# Patient Record
Sex: MALE | Race: WHITE | Employment: PART TIME | URBAN - METROPOLITAN AREA
[De-identification: names, ages, dates, MRNs, and addresses within clinical notes are randomized per-mention and may not be internally consistent; named-entity substitution may affect disease eponyms.]

---

## 2017-01-19 ENCOUNTER — HOSPITAL ENCOUNTER (OUTPATIENT)
Dept: LAB | Age: 72
Discharge: HOME | End: 2017-01-19
Payer: MEDICARE

## 2017-01-19 ENCOUNTER — OFFICE VISIT (OUTPATIENT)
Dept: INTERNAL MEDICINE CLINIC | Age: 72
End: 2017-01-19

## 2017-01-19 VITALS
SYSTOLIC BLOOD PRESSURE: 130 MMHG | HEART RATE: 65 BPM | DIASTOLIC BLOOD PRESSURE: 80 MMHG | WEIGHT: 191.8 LBS | HEIGHT: 69 IN | RESPIRATION RATE: 16 BRPM | TEMPERATURE: 97.9 F | BODY MASS INDEX: 28.41 KG/M2 | OXYGEN SATURATION: 97 %

## 2017-01-19 DIAGNOSIS — E11.8 TYPE 2 DIABETES MELLITUS WITH COMPLICATION, WITHOUT LONG-TERM CURRENT USE OF INSULIN (HCC): Primary | Chronic | ICD-10-CM

## 2017-01-19 DIAGNOSIS — M75.101 ROTATOR CUFF TEAR ARTHROPATHY OF RIGHT SHOULDER: ICD-10-CM

## 2017-01-19 DIAGNOSIS — M12.811 ROTATOR CUFF TEAR ARTHROPATHY OF RIGHT SHOULDER: ICD-10-CM

## 2017-01-19 DIAGNOSIS — D64.9 ANEMIA, UNSPECIFIED TYPE: ICD-10-CM

## 2017-01-19 DIAGNOSIS — I10 ESSENTIAL HYPERTENSION: Chronic | ICD-10-CM

## 2017-01-19 PROCEDURE — 80048 BASIC METABOLIC PNL TOTAL CA: CPT

## 2017-01-19 PROCEDURE — 83036 HEMOGLOBIN GLYCOSYLATED A1C: CPT

## 2017-01-19 PROCEDURE — 85025 COMPLETE CBC W/AUTO DIFF WBC: CPT

## 2017-01-19 PROCEDURE — 80061 LIPID PANEL: CPT

## 2017-01-19 PROCEDURE — 36415 COLL VENOUS BLD VENIPUNCTURE: CPT

## 2017-01-19 RX ORDER — LOSARTAN POTASSIUM 100 MG/1
100 TABLET ORAL DAILY
Qty: 90 TAB | Refills: 1 | Status: SHIPPED | OUTPATIENT
Start: 2017-01-19 | End: 2017-08-08 | Stop reason: SDUPTHER

## 2017-01-19 RX ORDER — TRAMADOL HYDROCHLORIDE 50 MG/1
50 TABLET ORAL
Qty: 180 TAB | Refills: 0 | Status: SHIPPED | OUTPATIENT
Start: 2017-01-19 | End: 2017-05-01 | Stop reason: SDUPTHER

## 2017-01-19 RX ORDER — METFORMIN HYDROCHLORIDE 1000 MG/1
1000 TABLET ORAL 2 TIMES DAILY WITH MEALS
Qty: 180 TAB | Refills: 1 | Status: SHIPPED | OUTPATIENT
Start: 2017-01-19 | End: 2017-07-21 | Stop reason: SDUPTHER

## 2017-01-19 NOTE — PROGRESS NOTES
Veknata Lopez is a 66 y.o. male who presents today for Labs (A1C check )  . He has a history of   Patient Active Problem List   Diagnosis Code    Rotator cuff tear M75.100    Biceps rupture, proximal S46.119A    Rotator cuff tear arthropathy of right shoulder M12.811    Primary localized osteoarthrosis, lower leg M17.10    Hypertension I10    DM type 2 (diabetes mellitus, type 2) ([facility]) E11.9    Renal insufficiency N28.9    GERD (gastroesophageal reflux disease) K21.9   . Today patient is here for follow up.   he does not have other concerns. Pt notes that still having therapy once weekly following his R shoulder surgery. His wife is having some significant neck pain and will be seeing  [employer] soon. Diabetes Mellitus follow-up - on both januvia and metromin. BG has been good. Interested in stopping Januvia 2/2 cost.   Last hemoglobin a1c   Lab Results   Component Value Date/Time    Hemoglobin A1c 6.3 10/18/2016 11:05 AM     No components found for: POCA1C, HBA1C POC  medication compliance: compliant all of the time. Diabetic diet compliance: compliant most of the time. Home glucose monitoring: fasting values range 100's. Hypoglycemic episodes:  none. Further diabetic ROS: no polyuria or polydipsia, no chest pain, dyspnea or TIA's, no numbness, tingling or pain in extremities. Diabetes Complications: none  Last Eye Exam: due  Last Foot Exam: today. Microalbuminuria:   Lab Results   Component Value Date/Time    Microalb/Creat ratio (ug/mg creat.) <2.8 10/18/2016 11:05 AM    Microalbumin, urine <3.0 10/18/2016 11:05 AM     Hypertension - Not checking at home. Has been under some stress. Hypertension ROS: taking medications as instructed, no medication side effects noted, no TIA's, no chest pain on exertion, no dyspnea on exertion, no swelling of ankles     reports that he has never smoked. He has never used smokeless tobacco.    reports that he drinks alcohol.    BP Readings from Last 2 Encounters:   01/19/17 130/80   10/18/16 128/74     ROS  Review of Systems   Constitutional: Negative for chills, fever and weight loss. HENT: Negative for congestion and sore throat. Eyes: Negative for blurred vision, double vision and photophobia. Respiratory: Negative for cough and shortness of breath. Cardiovascular: Negative for chest pain, palpitations and leg swelling. Gastrointestinal: Negative for abdominal pain, constipation, diarrhea, heartburn, nausea and vomiting. Genitourinary: Negative for dysuria, frequency and urgency. Musculoskeletal: Negative for joint pain and myalgias. Skin: Negative for rash. Neurological: Negative. Negative for headaches. Endo/Heme/Allergies: Does not bruise/bleed easily. Psychiatric/Behavioral: Negative for memory loss and suicidal ideas. Visit Vitals    /80 (BP 1 Location: Left arm, BP Patient Position: Sitting)    Pulse 65    Temp 97.9 °F (36.6 °C) (Oral)    Resp 16    Ht 5' 9\" (1.753 m)    Wt 191 lb 12.8 oz (87 kg)    SpO2 97%    BMI 28.32 kg/m2       Physical Exam   Constitutional: He is oriented to person, place, and time and well-developed, well-nourished, and in no distress. No distress. HENT:   Head: Normocephalic and atraumatic. Mouth/Throat: No oropharyngeal exudate. Eyes: Conjunctivae are normal. Pupils are equal, round, and reactive to light. No scleral icterus. Neck: Normal range of motion. No JVD present. No thyromegaly present. Cardiovascular: Normal rate and regular rhythm. Exam reveals no gallop and no friction rub. No murmur heard. Pulmonary/Chest: Effort normal and breath sounds normal. No respiratory distress. He has no wheezes. Abdominal: Soft. Bowel sounds are normal. He exhibits no distension. There is no rebound and no guarding. Musculoskeletal: Normal range of motion. He exhibits no edema. Neurological: He is alert and oriented to person, place, and time.  No cranial nerve deficit. Skin: Skin is dry. No rash noted. Psychiatric: Mood, memory, affect and judgment normal.   Foot exam  - skin intact, mild dryness w no fissures, no rashes, no significant ulcers or callous formation. Sensation intact by microfilament or light touch      Current Outpatient Prescriptions   Medication Sig    metFORMIN (GLUCOPHAGE) 1,000 mg tablet Take 1 Tab by mouth two (2) times daily (with meals).  losartan (COZAAR) 100 mg tablet Take 1 Tab by mouth daily.  traMADol (ULTRAM) 50 mg tablet Take 1 Tab by mouth two (2) times daily as needed for Pain. Max Daily Amount: 100 mg.    CALCIUM CARBONATE (CALCIUM 500 PO) Take 1 Tab by mouth two (2) times a day.  sitaGLIPtin (JANUVIA) 100 mg tablet Take 100 mg by mouth daily.  ergocalciferol (VITAMIN D2) 50,000 unit capsule Take 50,000 Units by mouth every thirty (30) days. Takes 1st of month    OMEPRAZOLE PO Take 20 mg by mouth daily.  FERROUS FUMARATE (IRON PO) Take 325 mg by mouth daily (after dinner).  FOLIC ACID/MULTIVIT-MIN/LUTEIN (CENTRUM SILVER PO) Take 1 Tab by mouth daily.  triamterene-hydrochlorothiazide (DYAZIDE) 37.5-25 mg per capsule Take 1 Cap by mouth daily. No current facility-administered medications for this visit.          Past Medical History   Diagnosis Date    Arthritis     Diabetes ([address]     GERD (gastroesophageal reflux disease)     Hypertension     Nausea & vomiting       Past Surgical History   Procedure Laterality Date    Hx cataract removal Bilateral 9/2012    Hx orthopaedic Left 1/2012     total knee replacement    Hx knee arthroscopy Left     Hx mohs procedure Right 3/2015     and bicep tear repair      Social History   Substance Use Topics    Smoking status: Never Smoker    Smokeless tobacco: Never Used    Alcohol use Yes      Comment: less than 1 beer/month      Family History   Problem Relation Age of Onset    Heart Disease Father     Stroke Father     Hypertension Father     Cancer Brother lung    Cancer Brother      lung        No Known Allergies     Assessment/Plan  Kalyani Davis was seen today for labs. Diagnoses and all orders for this visit:    Type 2 diabetes mellitus with complication, without long-term current use of insulin (HCC) - Good control. He is to make sure that he is taking 2000mg of metformin. Interested in stopping [place] [facility] [place] due to cost. If A1C stable of lower, will hold. Discussed sulfonoureas and potential harm given that they can drop his BG.   -     metFORMIN (GLUCOPHAGE) 1,000 mg tablet; Take 1 Tab by mouth two (2) times daily (with meals). -     HEMOGLOBIN A1C WITH EAG    Essential hypertension - At goal on repeat. Continue diuretic and ARB. -     LIPID PANEL  -     METABOLIC PANEL, BASIC  -     losartan (COZAAR) 100 mg tablet; Take 1 Tab by mouth daily.  -     CBC WITH AUTOMATED DIFF    Rotator cuff tear arthropathy of right shoulder - still taking tramadol. Discussed not taking this scheduled but rather as needed. -     traMADol (ULTRAM) 50 mg tablet; Take 1 Tab by mouth two (2) times daily as needed for Pain. Max Daily Amount: 100 mg.     Anemia, unspecified type - repeat, if normal hold Fe.   -     CBC WITH AUTOMATED DIFF        Follow-up Disposition:  Return in about 3 months (around 4/19/2017) for Medicare Wellness Exam.    Beatriz Panda MD  1/19/2017

## 2017-01-19 NOTE — MR AVS SNAPSHOT
Visit Information Date & Time Provider Department Dept. Phone Encounter #  
 1/19/2017  8:15 AM Soledad Post MD [facility] [address] [id number] Follow-up Instructions Return in about 3 months (around 4/19/2017) for Medicare Wellness Exam. Upcoming Health Maintenance Date Due  
 LIPID PANEL Q1 1945 EYE EXAM RETINAL OR DILATED Q1 12/18/1954 DTaP/Tdap/Td series (1 - Tdap) 12/18/1965 FOBT Q 1 YEAR AGE 50-75 12/18/1994 ZOSTER VACCINE AGE 60> 12/18/2004 GLAUCOMA SCREENING Q2Y 12/18/2009 Pneumococcal 65+ Low/Medium Risk (1 of 2 - PCV13) 12/18/2009 MEDICARE YEARLY EXAM 12/18/2009 HEMOGLOBIN A1C Q6M 4/18/2017 MICROALBUMIN Q1 10/18/2017 FOOT EXAM Q1 1/19/2018 Allergies as of 1/19/2017  Review Complete On: 1/19/2017 By: Louann Hummel No Known Allergies Current Immunizations  Never Reviewed Name Date Influenza High Dose Vaccine PF 10/18/2016 10:48 AM  
  
 Not reviewed this visit You Were Diagnosed With   
  
 Codes Comments Type 2 diabetes mellitus with complication, without long-term current use of insulin (HCC)    -  Primary ICD-10-CM: E11.8 ICD-9-CM: 250.90 Essential hypertension     ICD-10-CM: I10 
ICD-9-CM: 401.9 Rotator cuff tear arthropathy of right shoulder     ICD-10-CM: M12.811 ICD-9-CM: 716.81 Anemia, unspecified type     ICD-10-CM: D64.9 ICD-9-CM: 391. 9 Vitals BP Pulse Temp Resp Height(growth percentile) Weight(growth percentile) 130/80 (BP 1 Location: Left arm, BP Patient Position: Sitting) 65 97.9 °F (36.6 °C) (Oral) 16 5' 9\" (1.753 m) 191 lb 12.8 oz (87 kg) SpO2 BMI Smoking Status 97% 28.32 kg/m2 Never Smoker Vitals History BMI and BSA Data Body Mass Index Body Surface Area  
 28.32 kg/m 2 2.06 m 2 Preferred Pharmacy Pharmacy Name Phone  [address] AT [facility] OF  [facility] [phone] Your Updated Medication List  
  
   
This list is accurate as of: 1/19/17  9:16 AM.  Always use your most recent med list.  
  
  
  
  
 CALCIUM 500 PO Take 1 Tab by mouth two (2) times a day. CENTRUM SILVER PO Take 1 Tab by mouth daily. IRON PO Take 325 mg by mouth daily (after dinner). losartan 100 mg tablet Commonly known as:  COZAAR Take 1 Tab by mouth daily. metFORMIN 1,000 mg tablet Commonly known as:  GLUCOPHAGE Take 1 Tab by mouth two (2) times daily (with meals). OMEPRAZOLE PO Take 20 mg by mouth daily. SITagliptin 100 mg tablet Commonly known as:  Tran Readlyn Take 100 mg by mouth daily. traMADol 50 mg tablet Commonly known as:  ULTRAM  
Take 1 Tab by mouth two (2) times daily as needed for Pain. Max Daily Amount: 100 mg.  
  
 triamterene-hydroCHLOROthiazide 37.5-25 mg per capsule Commonly known as:  Roselyn Olszewski Take 1 Cap by mouth daily. VITAMIN D2 50,000 unit capsule Generic drug:  ergocalciferol Take 50,000 Units by mouth every thirty (30) days. Takes 1st of month Prescriptions Printed Refills  
 traMADol (ULTRAM) 50 mg tablet 0 Sig: Take 1 Tab by mouth two (2) times daily as needed for Pain. Max Daily Amount: 100 mg. Class: Print Route: Oral  
  
Prescriptions Sent to Pharmacy Refills  
 metFORMIN (GLUCOPHAGE) 1,000 mg tablet 1 Sig: Take 1 Tab by mouth two (2) times daily (with meals). Class: Normal  
 Pharmacy: [facility] [address] Ph #: [phone] Route: Oral  
 losartan (COZAAR) 100 mg tablet 1 Sig: Take 1 Tab by mouth daily. Class: Normal  
 Pharmacy: [facility] [address] Ph #: [phone] Route: Oral  
  
We Performed the Following  CBC WITH AUTOMATED DIFF [74804 CPT(R)] HEMOGLOBIN A1C WITH EAG [76204 CPT(R)] LIPID PANEL [12588 CPT(R)] METABOLIC PANEL, BASIC [20987 CPT(R)] Follow-up Instructions Return in about 3 months (around 4/19/2017) for Medicare Wellness Exam.  
  
  
Introducing [facility]! [employer] introduces [employer] patient portal. Now you can access parts of your medical record, email your doctor's office, and request medication refills online. 1. In your internet browser, go to [email or web address] 2. Click on the First Time User? Click Here link in the Sign In box. You will see the New Member Sign Up page. 3. Enter your [employer] Access Code exactly as it appears below. You will not need to use this code after youve completed the sign-up process. If you do not sign up before the expiration date, you must request a new code. · [employer] Access Code: [email or web address] Expires: 4/19/2017  9:16 AM 
 
4. Enter the last four digits of your Social Security Number (xxxx) and Date of Birth (mm/dd/yyyy) as indicated and click Submit. You will be taken to the next sign-up page. 5. Create a [employer] ID. This will be your [employer] login ID and cannot be changed, so think of one that is secure and easy to remember. 6. Create a [employer] password. You can change your password at any time. 7. Enter your Password Reset Question and Answer. This can be used at a later time if you forget your password. 8. Enter your e-mail address. You will receive e-mail notification when new information is available in [address] 9. Click Sign Up. You can now view and download portions of your medical record. 10. Click the Download Summary menu link to download a portable copy of your medical information. If you have questions, please visit the Frequently Asked Questions section of the [employer] website. Remember, [employer] is NOT to be used for urgent needs. For medical emergencies, dial 911. Now available from your iPhone and Android! Please provide this summary of care documentation to your next provider. Your primary care clinician is listed as Frances Simmonds. If you have any questions after today's visit, please call [phone].

## 2017-01-20 LAB
BASOPHILS # BLD AUTO: 0 X10E3/UL (ref 0–0.2)
BASOPHILS NFR BLD AUTO: 0 %
BUN SERPL-MCNC: 23 MG/DL (ref 8–27)
BUN/CREAT SERPL: 18 (ref 10–22)
CALCIUM SERPL-MCNC: 9.5 MG/DL (ref 8.6–10.2)
CHLORIDE SERPL-SCNC: 99 MMOL/L (ref 96–106)
CHOLEST SERPL-MCNC: 131 MG/DL (ref 100–199)
CO2 SERPL-SCNC: 25 MMOL/L (ref 18–29)
CREAT SERPL-MCNC: 1.29 MG/DL (ref 0.76–1.27)
EOSINOPHIL # BLD AUTO: 0.1 X10E3/UL (ref 0–0.4)
EOSINOPHIL NFR BLD AUTO: 1 %
ERYTHROCYTE [DISTWIDTH] IN BLOOD BY AUTOMATED COUNT: 13.5 % (ref 12.3–15.4)
EST. AVERAGE GLUCOSE BLD GHB EST-MCNC: 126 MG/DL
GLUCOSE SERPL-MCNC: 122 MG/DL (ref 65–99)
HBA1C MFR BLD: 6 % (ref 4.8–5.6)
HCT VFR BLD AUTO: 41.3 % (ref 37.5–51)
HDLC SERPL-MCNC: 34 MG/DL
HGB BLD-MCNC: 14 G/DL (ref 12.6–17.7)
IMM GRANULOCYTES # BLD: 0 X10E3/UL (ref 0–0.1)
IMM GRANULOCYTES NFR BLD: 0 %
INTERPRETATION, 910389: NORMAL
INTERPRETATION: NORMAL
LDLC SERPL CALC-MCNC: 74 MG/DL (ref 0–99)
LYMPHOCYTES # BLD AUTO: 1.9 X10E3/UL (ref 0.7–3.1)
LYMPHOCYTES NFR BLD AUTO: 29 %
MCH RBC QN AUTO: 30.8 PG (ref 26.6–33)
MCHC RBC AUTO-ENTMCNC: 33.9 G/DL (ref 31.5–35.7)
MCV RBC AUTO: 91 FL (ref 79–97)
MONOCYTES # BLD AUTO: 0.6 X10E3/UL (ref 0.1–0.9)
MONOCYTES NFR BLD AUTO: 9 %
NEUTROPHILS # BLD AUTO: 4 X10E3/UL (ref 1.4–7)
NEUTROPHILS NFR BLD AUTO: 61 %
PLATELET # BLD AUTO: 192 X10E3/UL (ref 150–379)
POTASSIUM SERPL-SCNC: 4.6 MMOL/L (ref 3.5–5.2)
RBC # BLD AUTO: 4.55 X10E6/UL (ref 4.14–5.8)
SODIUM SERPL-SCNC: 141 MMOL/L (ref 134–144)
TRIGL SERPL-MCNC: 115 MG/DL (ref 0–149)
VLDLC SERPL CALC-MCNC: 23 MG/DL (ref 5–40)
WBC # BLD AUTO: 6.6 X10E3/UL (ref 3.4–10.8)

## 2017-05-01 ENCOUNTER — HOSPITAL ENCOUNTER (OUTPATIENT)
Dept: LAB | Age: 72
Discharge: HOME | End: 2017-05-01
Payer: MEDICARE

## 2017-05-01 ENCOUNTER — OFFICE VISIT (OUTPATIENT)
Dept: INTERNAL MEDICINE CLINIC | Age: 72
End: 2017-05-01

## 2017-05-01 VITALS
RESPIRATION RATE: 16 BRPM | DIASTOLIC BLOOD PRESSURE: 90 MMHG | SYSTOLIC BLOOD PRESSURE: 140 MMHG | WEIGHT: 198 LBS | BODY MASS INDEX: 29.33 KG/M2 | HEART RATE: 62 BPM | OXYGEN SATURATION: 96 % | TEMPERATURE: 98.1 F | HEIGHT: 69 IN

## 2017-05-01 DIAGNOSIS — N28.9 RENAL INSUFFICIENCY: Chronic | ICD-10-CM

## 2017-05-01 DIAGNOSIS — G89.29 CHRONIC PAIN OF LEFT KNEE: ICD-10-CM

## 2017-05-01 DIAGNOSIS — M25.562 CHRONIC PAIN OF LEFT KNEE: ICD-10-CM

## 2017-05-01 DIAGNOSIS — E11.8 TYPE 2 DIABETES MELLITUS WITH COMPLICATION, WITHOUT LONG-TERM CURRENT USE OF INSULIN (HCC): Primary | Chronic | ICD-10-CM

## 2017-05-01 DIAGNOSIS — I10 ESSENTIAL HYPERTENSION: Chronic | ICD-10-CM

## 2017-05-01 PROCEDURE — 83036 HEMOGLOBIN GLYCOSYLATED A1C: CPT

## 2017-05-01 PROCEDURE — 80048 BASIC METABOLIC PNL TOTAL CA: CPT

## 2017-05-01 RX ORDER — TRAMADOL HYDROCHLORIDE 50 MG/1
50 TABLET ORAL
Qty: 180 TAB | Refills: 0 | Status: SHIPPED | OUTPATIENT
Start: 2017-05-01 | End: 2017-08-08 | Stop reason: SDUPTHER

## 2017-05-01 RX ORDER — TRIAMTERENE/HYDROCHLOROTHIAZID 37.5-25 MG
TABLET ORAL
Refills: 3 | COMMUNITY
Start: 2017-03-04 | End: 2017-08-08 | Stop reason: SDUPTHER

## 2017-05-01 NOTE — MR AVS SNAPSHOT
Visit Information Date & Time Provider Department Dept. Phone Encounter #  
 5/1/2017  3:45 PM Nicolasa Ramirez MD [facility] [address] [id number] Follow-up Instructions Return in about 3 months (around 8/1/2017) for Medicare wellness. Upcoming Health Maintenance Date Due  
 EYE EXAM RETINAL OR DILATED Q1 12/18/1954 DTaP/Tdap/Td series (1 - Tdap) 12/18/1965 FOBT Q 1 YEAR AGE 50-75 12/18/1994 ZOSTER VACCINE AGE 60> 12/18/2004 GLAUCOMA SCREENING Q2Y 12/18/2009 Pneumococcal 65+ Low/Medium Risk (1 of 2 - PCV13) 12/18/2009 MEDICARE YEARLY EXAM 12/18/2009 HEMOGLOBIN A1C Q6M 7/19/2017 INFLUENZA AGE 9 TO ADULT 8/1/2017 MICROALBUMIN Q1 10/18/2017 FOOT EXAM Q1 1/19/2018 LIPID PANEL Q1 1/19/2018 Allergies as of 5/1/2017  Review Complete On: 5/1/2017 By: Nicolasa Ramirez MD  
 No Known Allergies Current Immunizations  Never Reviewed Name Date Influenza High Dose Vaccine PF 10/18/2016 10:48 AM  
  
 Not reviewed this visit You Were Diagnosed With   
  
 Codes Comments Type 2 diabetes mellitus with complication, without long-term current use of insulin (HCC)    -  Primary ICD-10-CM: E11.8 ICD-9-CM: 250.90 Essential hypertension     ICD-10-CM: I10 
ICD-9-CM: 401.9 Renal insufficiency     ICD-10-CM: N28.9 ICD-9-CM: 593.9 Chronic pain of left knee     ICD-10-CM: M25.562, G89.29 ICD-9-CM: 719.46, 338.29 Vitals BP Pulse Temp Resp Height(growth percentile) Weight(growth percentile) 140/90 (BP 1 Location: Left arm, BP Patient Position: Sitting) 62 98.1 °F (36.7 °C) (Oral) 16 5' 9\" (1.753 m) 198 lb (89.8 kg) SpO2 BMI Smoking Status 96% 29.24 kg/m2 Never Smoker Vitals History BMI and BSA Data Body Mass Index Body Surface Area  
 29.24 kg/m 2 2.09 m 2 Preferred Pharmacy Pharmacy Name Phone  [address] [facility] [address] [facility] [phone] Your Updated Medication List  
  
   
This list is accurate as of: 5/1/17  4:31 PM.  Always use your most recent med list.  
  
  
  
  
 CALCIUM 500 PO Take 1 Tab by mouth two (2) times a day. CENTRUM SILVER PO Take 1 Tab by mouth daily. IRON PO Take 325 mg by mouth daily (after dinner). JANUVIA 100 mg tablet Generic drug:  SITagliptin Take 100 mg by mouth daily. losartan 100 mg tablet Commonly known as:  COZAAR Take 1 Tab by mouth daily. metFORMIN 1,000 mg tablet Commonly known as:  GLUCOPHAGE Take 1 Tab by mouth two (2) times daily (with meals). OMEPRAZOLE PO Take 20 mg by mouth daily. traMADol 50 mg tablet Commonly known as:  ULTRAM  
Take 1 Tab by mouth two (2) times daily as needed for Pain. Max Daily Amount: 100 mg.  
  
 * triamterene-hydroCHLOROthiazide 37.5-25 mg per capsule Commonly known as:  Jose Velasco Take 1 Cap by mouth daily. * triamterene-hydroCHLOROthiazide 37.5-25 mg per tablet Commonly known as:  Tabatha Cheek TK 1 T PO QD  
  
 VITAMIN D2 50,000 unit capsule Generic drug:  ergocalciferol Take 50,000 Units by mouth every thirty (30) days. Takes 1st of month * Notice: This list has 2 medication(s) that are the same as other medications prescribed for you. Read the directions carefully, and ask your doctor or other care provider to review them with you. Prescriptions Printed Refills  
 traMADol (ULTRAM) 50 mg tablet 0 Sig: Take 1 Tab by mouth two (2) times daily as needed for Pain. Max Daily Amount: 100 mg. Class: Print Route: Oral  
  
We Performed the Following HEMOGLOBIN A1C WITH EAG [76085 CPT(R)] METABOLIC PANEL, BASIC [34380 CPT(R)] Follow-up Instructions Return in about 3 months (around 8/1/2017) for Medicare wellness. Introducing [facility]!    
 [employer] introduces [employer] patient portal. Now you can access parts of your medical record, email your doctor's office, and request medication refills online. 1. In your internet browser, go to [email or web address] 2. Click on the First Time User? Click Here link in the Sign In box. You will see the New Member Sign Up page. 3. Enter your [employer] Access Code exactly as it appears below. You will not need to use this code after youve completed the sign-up process. If you do not sign up before the expiration date, you must request a new code. · [employer] Access Code: [email or web address] Expires: 7/30/2017  4:30 PM 
 
4. Enter the last four digits of your Social Security Number (xxxx) and Date of Birth (mm/dd/yyyy) as indicated and click Submit. You will be taken to the next sign-up page. 5. Create a [employer] ID. This will be your [employer] login ID and cannot be changed, so think of one that is secure and easy to remember. 6. Create a [employer] password. You can change your password at any time. 7. Enter your Password Reset Question and Answer. This can be used at a later time if you forget your password. 8. Enter your e-mail address. You will receive e-mail notification when new information is available in [address] 9. Click Sign Up. You can now view and download portions of your medical record. 10. Click the Download Summary menu link to download a portable copy of your medical information. If you have questions, please visit the Frequently Asked Questions section of the [employer] website. Remember, [employer] is NOT to be used for urgent needs. For medical emergencies, dial 911. Now available from your iPhone and Android! Please provide this summary of care documentation to your next provider. Your primary care clinician is listed as Capri Lorenzo. If you have any questions after today's visit, please call [phone].

## 2017-05-01 NOTE — PROGRESS NOTES
Ne Dawson is a 66 y.o. male who presents today for Hypertension and Diabetes  . He has a history of   Patient Active Problem List   Diagnosis Code    Rotator cuff tear M75.100    Biceps rupture, proximal S46.119A    Rotator cuff tear arthropathy of right shoulder M12.811    Primary localized osteoarthrosis, lower leg M17.10    Hypertension I10    DM type 2 (diabetes mellitus, type 2) ([facility]) E11.9    Renal insufficiency N28.9    GERD (gastroesophageal reflux disease) K21.9   . Today patient is here for f/u.   he does have other concerns. L knee replaced in 2012. Done in [place]. Now having pain with movement. Achy ness. No actual weakness. No edema or erythema. Hypertension - On maxide/ ARB. Borderline control. Hypertension ROS: taking medications as instructed, no medication side effects noted, no TIA's, no chest pain on exertion, no dyspnea on exertion, no swelling of ankles     reports that he has never smoked. He has never used smokeless tobacco.    reports that he drinks alcohol. BP Readings from Last 2 Encounters:   05/01/17 140/90   01/19/17 130/80     Diabetes Mellitus follow-up - Januvia stopped due to price. BG went up to 150s and he resumed. Paying 102$ for 3 mos. Last hemoglobin a1c   Lab Results   Component Value Date/Time    Hemoglobin A1c 6.0 01/19/2017 09:36 AM     No components found for: POCA1C, HBA1C POC  medication compliance: compliant all of the time. Diabetic diet compliance: compliant most of the time. Home glucose monitoring: fasting values range 120. Hypoglycemic episodes:  none. Further diabetic ROS: no polyuria or polydipsia, no chest pain, dyspnea or TIA's, no numbness, tingling or pain in extremities.    Diabetes Complications: none  Microalbuminuria:   Lab Results   Component Value Date/Time    Microalb/Creat ratio (ug/mg creat.) <2.8 10/18/2016 11:05 AM    Microalbumin, urine <3.0 10/18/2016 11:05 AM         ROS  Review of Systems Constitutional: Negative for chills, fever and weight loss. HENT: Negative for congestion and sore throat. Eyes: Negative for blurred vision, double vision and photophobia. Respiratory: Negative for cough and shortness of breath. Cardiovascular: Negative for chest pain, palpitations and leg swelling. Gastrointestinal: Negative for abdominal pain, constipation, diarrhea, heartburn, nausea and vomiting. Genitourinary: Negative for dysuria, frequency and urgency. Musculoskeletal: Positive for joint pain. Negative for myalgias. Skin: Negative for rash. Neurological: Negative. Negative for headaches. Endo/Heme/Allergies: Does not bruise/bleed easily. Psychiatric/Behavioral: Negative for memory loss and suicidal ideas. Visit Vitals    /90 (BP 1 Location: Left arm, BP Patient Position: Sitting)    Pulse 62    Temp 98.1 °F (36.7 °C) (Oral)    Resp 16    Ht 5' 9\" (1.753 m)    Wt 198 lb (89.8 kg)    SpO2 96%    BMI 29.24 kg/m2       Physical Exam   Constitutional: He appears well-nourished. HENT:   Head: Normocephalic and atraumatic. Eyes: EOM are normal. Pupils are equal, round, and reactive to light. Neck: Normal range of motion. Neck supple. Cardiovascular: Normal rate and regular rhythm. No murmur heard. Pulmonary/Chest: Effort normal and breath sounds normal. No respiratory distress. Abdominal: Soft. Bowel sounds are normal. He exhibits no distension. Musculoskeletal: Normal range of motion. He exhibits no edema. L knee replacement present   Neurological: He is alert. Skin: Skin is warm and dry. Psychiatric: He has a normal mood and affect. His behavior is normal.         Current Outpatient Prescriptions   Medication Sig    SITagliptin (JANUVIA) 100 mg tablet Take 100 mg by mouth daily.  traMADol (ULTRAM) 50 mg tablet Take 1 Tab by mouth two (2) times daily as needed for Pain.  Max Daily Amount: 100 mg.    metFORMIN (GLUCOPHAGE) 1,000 mg tablet Take 1 Tab by mouth two (2) times daily (with meals).  losartan (COZAAR) 100 mg tablet Take 1 Tab by mouth daily.  CALCIUM CARBONATE (CALCIUM 500 PO) Take 1 Tab by mouth two (2) times a day.  ergocalciferol (VITAMIN D2) 50,000 unit capsule Take 50,000 Units by mouth every thirty (30) days. Takes 1st of month    OMEPRAZOLE PO Take 20 mg by mouth daily.  FERROUS FUMARATE (IRON PO) Take 325 mg by mouth daily (after dinner).  FOLIC ACID/MULTIVIT-MIN/LUTEIN (CENTRUM SILVER PO) Take 1 Tab by mouth daily.  triamterene-hydrochlorothiazide (DYAZIDE) 37.5-25 mg per capsule Take 1 Cap by mouth daily.  triamterene-hydroCHLOROthiazide (MAXZIDE) 37.5-25 mg per tablet TK 1 T PO QD     No current facility-administered medications for this visit. Past Medical History:   Diagnosis Date    Arthritis     Diabetes ([address]     GERD (gastroesophageal reflux disease)     Hypertension     Nausea & vomiting       Past Surgical History:   Procedure Laterality Date    HX CATARACT REMOVAL Bilateral 9/2012    HX KNEE ARTHROSCOPY Left     HX MOHS PROCEDURES Right 3/2015    and bicep tear repair    HX ORTHOPAEDIC Left 1/2012    total knee replacement      Social History   Substance Use Topics    Smoking status: Never Smoker    Smokeless tobacco: Never Used    Alcohol use Yes      Comment: less than 1 beer/month      Family History   Problem Relation Age of Onset    Heart Disease Father     Stroke Father     Hypertension Father     Cancer Brother      lung    Cancer Brother      lung        No Known Allergies     Assessment/Plan  Lum Butt was seen today for hypertension and diabetes. Diagnoses and all orders for this visit:    Type 2 diabetes mellitus with complication, without long-term current use of insulin ([address] - Unable to stop DPP4. Will call insurance to see if other in class is cheaper.  Blood work today.   -     HEMOGLOBIN A1C WITH EAG  -     METABOLIC PANEL, BASIC    Essential hypertension - borderline control. Monitor at home. Renal insufficiency - has been stable. Avoid NSAIDs. Chronic pain of left knee - will talk with Dr. Monica Arechiga about his knee. -     traMADol (ULTRAM) 50 mg tablet; Take 1 Tab by mouth two (2) times daily as needed for Pain. Max Daily Amount: 100 mg. Follow-up Disposition:  Return in about 3 months (around 8/1/2017) for Medicare wellness.     Christophe Wiley MD  5/1/2017

## 2017-05-02 LAB
BUN SERPL-MCNC: 21 MG/DL (ref 8–27)
BUN/CREAT SERPL: 17 (ref 10–24)
CALCIUM SERPL-MCNC: 9.1 MG/DL (ref 8.6–10.2)
CHLORIDE SERPL-SCNC: 103 MMOL/L (ref 96–106)
CO2 SERPL-SCNC: 20 MMOL/L (ref 18–29)
CREAT SERPL-MCNC: 1.26 MG/DL (ref 0.76–1.27)
EST. AVERAGE GLUCOSE BLD GHB EST-MCNC: 134 MG/DL
GLUCOSE SERPL-MCNC: 95 MG/DL (ref 65–99)
HBA1C MFR BLD: 6.3 % (ref 4.8–5.6)
INTERPRETATION: NORMAL
Lab: NORMAL
POTASSIUM SERPL-SCNC: 4.3 MMOL/L (ref 3.5–5.2)
SODIUM SERPL-SCNC: 146 MMOL/L (ref 134–144)

## 2017-06-26 RX ORDER — BLOOD SUGAR DIAGNOSTIC
STRIP MISCELLANEOUS
Qty: 100 STRIP | Refills: 0 | Status: SHIPPED | OUTPATIENT
Start: 2017-06-26 | End: 2018-01-02 | Stop reason: SDUPTHER

## 2017-06-26 RX ORDER — ISOPROPYL ALCOHOL 0.75 G/1
SWAB TOPICAL
Qty: 100 PAD | Refills: 11 | Status: SHIPPED | OUTPATIENT
Start: 2017-06-26 | End: 2018-07-21 | Stop reason: SDUPTHER

## 2017-06-26 RX ORDER — LANCETS 30 GAUGE
EACH MISCELLANEOUS
Qty: 100 LANCET | Refills: 11 | Status: SHIPPED | OUTPATIENT
Start: 2017-06-26 | End: 2018-07-21 | Stop reason: SDUPTHER

## 2017-07-06 ENCOUNTER — OFFICE VISIT (OUTPATIENT)
Dept: INTERNAL MEDICINE CLINIC | Age: 72
End: 2017-07-06

## 2017-07-06 VITALS
WEIGHT: 195 LBS | RESPIRATION RATE: 10 BRPM | BODY MASS INDEX: 28.88 KG/M2 | OXYGEN SATURATION: 95 % | DIASTOLIC BLOOD PRESSURE: 81 MMHG | HEART RATE: 68 BPM | TEMPERATURE: 98 F | SYSTOLIC BLOOD PRESSURE: 136 MMHG | HEIGHT: 69 IN

## 2017-07-06 DIAGNOSIS — L30.9 DERMATITIS: ICD-10-CM

## 2017-07-06 DIAGNOSIS — Z00.00 MEDICARE ANNUAL WELLNESS VISIT, SUBSEQUENT: Primary | ICD-10-CM

## 2017-07-06 DIAGNOSIS — Z00.00 ROUTINE GENERAL MEDICAL EXAMINATION AT A HEALTH CARE FACILITY: ICD-10-CM

## 2017-07-06 DIAGNOSIS — Z13.31 DEPRESSION SCREEN: ICD-10-CM

## 2017-07-06 DIAGNOSIS — Z71.89 ADVANCED CARE PLANNING/COUNSELING DISCUSSION: ICD-10-CM

## 2017-07-06 DIAGNOSIS — Z13.39 SCREENING FOR ALCOHOLISM: ICD-10-CM

## 2017-07-06 RX ORDER — LORATADINE 10 MG/1
TABLET ORAL
Refills: 0 | COMMUNITY
Start: 2017-07-01

## 2017-07-06 NOTE — PROGRESS NOTES
Jai Fisher is a 66 y.o. male who presents today for Skin Problem (possible insect bites on body, multiple spots, went to [facility] on 7/1/2017, dx: dermatitits, Rx for Atarax, ABX and ointment, itching has improved) and Annual Wellness Visit  . He has a history of   Patient Active Problem List   Diagnosis Code    Rotator cuff tear M75.100    Biceps rupture, proximal S46.119A    Rotator cuff tear arthropathy of right shoulder M12.811    Primary localized osteoarthrosis, lower leg M17.10    Hypertension I10    DM type 2 [facility]diabetes mellitus, type 2) ([facility]) E11.9    Renal insufficiency N28.9    GERD (gastroesophageal reflux disease) K21.9   . Today patient is here for f/u. NN performed MW exam.  Immunizations noted to be UTD, will request from pharmacy. C-scope in 2015 and had 3 polyp. 3-5 yr f/u.    he does not have other concerns. Rash: noted that he had several bug bites last week. Then had hives. Seen at Urgent Care Visit: seen on 7/1 for rash. Diagnosed with dermatitis. Given benadryl/claritin. Kenalog/Promethrin cream. Since notes that he has been improving. Has since changed soaps and detergents. ROS  Review of Systems   Constitutional: Negative for chills, fever, malaise/fatigue and weight loss. HENT: Negative for ear pain, hearing loss and tinnitus. Eyes: Negative for blurred vision, double vision, photophobia and pain. Respiratory: Negative for cough, hemoptysis and sputum production. Cardiovascular: Negative for chest pain, palpitations, orthopnea and claudication. Gastrointestinal: Negative for abdominal pain, diarrhea, heartburn, nausea and vomiting. Genitourinary: Negative for dysuria, frequency and urgency. Skin: Positive for itching and rash. Neurological: Negative for dizziness, tingling, tremors and headaches. Psychiatric/Behavioral: Negative for depression, substance abuse and suicidal ideas.        Visit Vitals    /81 (BP 1 Location: Left arm, BP Patient Position: Sitting)    Pulse 68    Temp 98 °F (36.7 °C) (Oral)    Resp 10    Ht 5' 9\" (1.753 m)    Wt 195 lb (88.5 kg)    SpO2 95%    BMI 28.8 kg/m2       Physical Exam   Constitutional: He is oriented to person, place, and time. He appears well-developed and well-nourished. Cardiovascular: Normal rate and regular rhythm. Pulmonary/Chest: Effort normal and breath sounds normal. No respiratory distress. Abdominal: Soft. Bowel sounds are normal.   Neurological: He is alert and oriented to person, place, and time. Skin:        Healing lesions on multiple body parts. Psychiatric: He has a normal mood and affect. His behavior is normal.         Current Outpatient Prescriptions   Medication Sig    ONETOUCH DELICA LANCETS 30 gauge misc CHECK BLOOD SUGAR DAILY    BD SINGLE USE SWABS REGULAR padm CHECK BLOOD SUGAR DAILY    ONETOUCH ULTRA TEST strip CHECK BLOOD SUGAR DAILY    SITagliptin (JANUVIA) 100 mg tablet Take 100 mg by mouth daily.  traMADol (ULTRAM) 50 mg tablet Take 1 Tab by mouth two (2) times daily as needed for Pain. Max Daily Amount: 100 mg.    metFORMIN (GLUCOPHAGE) 1,000 mg tablet Take 1 Tab by mouth two (2) times daily (with meals).  losartan (COZAAR) 100 mg tablet Take 1 Tab by mouth daily.  CALCIUM CARBONATE (CALCIUM 500 PO) Take 1 Tab by mouth two (2) times a day.  ergocalciferol (VITAMIN D2) 50,000 unit capsule Take 50,000 Units by mouth every thirty (30) days. Takes 1st of month    OMEPRAZOLE PO Take 20 mg by mouth daily.  FERROUS FUMARATE (IRON PO) Take 325 mg by mouth daily (after dinner).  FOLIC ACID/MULTIVIT-MIN/LUTEIN (CENTRUM SILVER PO) Take 1 Tab by mouth daily.  triamterene-hydrochlorothiazide (DYAZIDE) 37.5-25 mg per capsule Take 1 Cap by mouth daily.     loratadine (CLARITIN) 10 mg tablet TK 1 T PO QD FOR ALLERGY SYPMTOMS    triamterene-hydroCHLOROthiazide (MAXZIDE) 37.5-25 mg per tablet TK 1 T PO QD     No current facility-administered medications for this visit. Past Medical History:   Diagnosis Date    Arthritis     Diabetes ([address]     GERD (gastroesophageal reflux disease)     Hypertension     Nausea & vomiting       Past Surgical History:   Procedure Laterality Date    HX CATARACT REMOVAL Bilateral 9/2012    HX KNEE ARTHROSCOPY Left     HX MOHS PROCEDURES Right 3/2015    and bicep tear repair    HX ORTHOPAEDIC Left 1/2012    total knee replacement      Social History   Substance Use Topics    Smoking status: Never Smoker    Smokeless tobacco: Never Used    Alcohol use Yes      Comment: less than 1 beer/month      Family History   Problem Relation Age of Onset    Heart Disease Father     Stroke Father     Hypertension Father     Cancer Brother      lung    Cancer Brother      lung        No Known Allergies     Assessment/Plan  Jalen Novak was seen today for skin problem and annual wellness visit. Diagnoses and all orders for this visit:    Medicare annual wellness visit, subsequent    Dermatitis - Insect bite vs. Contact. Resolving rash. Continue free/clear soaps/detergents. Routine general medical examination at a health care facility    Screening for alcoholism    Advanced care planning/counseling discussion    Depression screen        Follow-up Disposition:  Return for As scheduled.     Sharmin Wilcox MD  7/6/2017

## 2017-07-06 NOTE — PATIENT INSTRUCTIONS
Medicare Part B Preventive Services Limitations Recommendation Scheduled   Cardiovascular Screening Blood Tests (every 5 years)  Total cholesterol, HDL, Triglycerides Order as a panel if possible Completed:  1/2017    As recommended by your PCP As recommended by your PCP   Colorectal Cancer Screening  -Fecal occult blood test (annual)  -Flexible sigmoidoscopy (5y)  -Screening colonoscopy (10y)  -Barium Enema Age 49-80; after age [de-identified] if history of abnormal results Completed:  2015-per pt with Dr. Olga Kimbrough obtain records      Recommended every 5 to every 10 years As recommenced by your PCP/Specialist   Counseling to Prevent Tobacco Use (up to 8 sessions per year)  - Counseling greater than 3 and up to 10 minutes  - Counseling greater than 10 minutes Patients must be asymptomatic of tobacco-related conditions to receive as preventive service N/A N/A   Diabetes Screening Tests (at least every 3 years, Medicare covers annually or at 6-month intervals for prediabetic patients)    Fasting blood sugar (FBS) or glucose tolerance test (GTT)         Patient must be diagnosed with one of the following:  -Hypertension, Dyslipidemia, obesity, previous impaired FBS or GTT  Or any two of the following: overweight, FH of diabetes, age ? 65 Completed:    5/2017    Recommended every 3 years for non-diabetics. Recommended every 3-6 moths for Pre-Diabetics and Diabetes  Due Per PCP   Diabetes Self-Management Training (DSMT) (no USPSTF recommendation) Requires referral by treating physician for patient with diabetes or renal disease. 10 hours of initial DSMT session of no less than 30 minutes each in a continuous 12-month period. 2 hours of follow-up DSMT in subsequent years.  n/a n/a   Glaucoma Screening (no USPSTF recommendation) Diabetes mellitus, family history, , age 48 or over,  American, age 72 or over Completed:    2017-with [facility] per pt    Recommended annually DUE: annually     Medical Nutrition Therapy (MNT) (for diabetes or renal disease not recommended schedule)   Requires referral by treating physician for patient with diabetes or renal disease. Can be provided in same year as diabetes self-management training (DSMT), and CMS recommends medical nutrition therapy take place after DSMT. Up to 3 hours for initial year and 2 hours in subsequent years. n/a n/a       Prostate Cancer Screening (annually up to age 76)  - Digital rectal exam (DAPHNE)  - Prostate specific antigen (PSA)     Annually (age 48 or over), DAPHNE not paid separately when covered E/M service is provided on same date   As recommended by your PCP. Recommended annually to age 76. As recommended by your specialist or PCP. Seasonal Influenza Vaccination (annually)  Completed:    10/2016   Recommended annually   Pneumococcal Vaccination (once after 65)  Completed:  Per pt given-will call to confirm complete   TDAP Vaccine    Per pt given-will call to confirm  Recommenced every 10 years complete   Shingles Vaccine    Per pt 2012-will try to obtain records  Recommended once over the age 48. complete     Prevnar 13 Vaccine      Per pt given  Recommenced once over the age of 72.  complete   Hepatitis B Vaccinations (if medium/high risk) Medium/high risk factors:  End-stage renal disease,  Hemophiliacs who received Factor VIII or IX concentrates, Clients of institutions for the mentally retarded, Persons who live in the same house as a HepB virus carrier, Homosexual men, Illicit injectable drug abusers. n/a n/a      Ultrasound Screening for A b dominal Aortic Aneurysm (AAA) (once)   Patient must be referred through IPPE and not have had a screening for abdominal aortic aneurysm before under Medicare.   Limited to patients who meet one of the following criteria:  - Men who are 73-68 years old and have smoked more than 100 cigarettes in their lifetime.  -Anyone with a FH of AAA  -Anyone recommended for screening by USPSTF   Not covered by Medicare as preventive. n/a   Thanks for coming in today. It was nice to spend some time with you. If you have any questions about your visit today, please call [phone] and ask to speak with Eloise. Dermatitis: Care Instructions  Your Care Instructions  Dermatitis is the general name used for any rash or inflammation of the skin. Different kinds of dermatitis cause different kinds of rashes. Common causes of a rash include new medicines, plants (such as poison oak or poison ivy), heat, and stress. Certain illnesses can also cause a rash. An allergic reaction to something that touches your skin, such as latex, nickel, or poison ivy, is called contact dermatitis. Contact dermatitis may also be caused by something that irritates the skin, such as bleach, a chemical, or soap. These types of rashes cannot be spread from person to person. How long your rash will last depends on what caused it. Rashes may last a few days or months. Follow-up care is a key part of your treatment and safety. Be sure to make and go to all appointments, and call your doctor if you are having problems. It's also a good idea to know your test results and keep a list of the medicines you take. How can you care for yourself at home? · Do not scratch the rash. Cut your nails short, and file them smooth. Or wear gloves if this helps keep you from scratching. · Wash the area with water only. Pat dry. · Put cold, wet cloths on the rash to reduce itching. · Keep cool, and stay out of the sun. · Leave the rash open to the air as much as possible. · If the rash itches, use hydrocortisone cream. Follow the directions on the label. Calamine lotion may help for plant rashes. · Take an over-the-counter antihistamine, such as diphenhydramine (Benadryl) or loratadine (Claritin), to help calm the itching. Read and follow all instructions on the label. · If your doctor prescribed a cream, use it as directed.  If your doctor prescribed medicine, take it exactly as directed. When should you call for help? Call your doctor now or seek immediate medical care if:  · You have symptoms of infection, such as:  ¨ Increased pain, swelling, warmth, or redness. ¨ Red streaks leading from the area. ¨ Pus draining from the area. ¨ A fever. · You have joint pain along with the rash. Watch closely for changes in your health, and be sure to contact your doctor if:  · Your rash is changing or getting worse. · You are not getting better as expected. Where can you learn more? Go to http://anselmo-salbador.info/. Enter [phone] in the search box to learn more about \"Dermatitis: Care Instructions. \"  Current as of: October 13, 2016  Content Version: 11.3  © 7091-1909 [employer]. Care instructions adapted under license by [employer] (which disclaims liability or warranty for this information). If you have questions about a medical condition or this instruction, always ask your healthcare professional. [place] [address] any warranty or liability for your use of this information.

## 2017-07-06 NOTE — PROGRESS NOTES
This is a Subsequent Medicare Annual Wellness Visit providing Personalized Prevention Plan Services (PPPS) (Performed 12 months after initial AWV and PPPS )    I have reviewed the patient's medical history in detail and updated the computerized patient record. History     Past Medical History:   Diagnosis Date    Arthritis     Diabetes ([address]     GERD (gastroesophageal reflux disease)     Hypertension     Nausea & vomiting       Past Surgical History:   Procedure Laterality Date    HX CATARACT REMOVAL Bilateral 9/2012    HX KNEE ARTHROSCOPY Left     HX MOHS PROCEDURES Right 3/2015    and bicep tear repair    HX ORTHOPAEDIC Left 1/2012    total knee replacement     Current Outpatient Prescriptions   Medication Sig Dispense Refill    ONETOUCH DELICA LANCETS 30 gauge misc CHECK BLOOD SUGAR DAILY 100 Lancet 11    BD SINGLE USE SWABS REGULAR padm CHECK BLOOD SUGAR DAILY 100 Pad 11    ONETOUCH ULTRA TEST strip CHECK BLOOD SUGAR DAILY 100 Strip 0    SITagliptin (JANUVIA) 100 mg tablet Take 100 mg by mouth daily.  traMADol (ULTRAM) 50 mg tablet Take 1 Tab by mouth two (2) times daily as needed for Pain. Max Daily Amount: 100 mg. 180 Tab 0    metFORMIN (GLUCOPHAGE) 1,000 mg tablet Take 1 Tab by mouth two (2) times daily (with meals). 180 Tab 1    losartan (COZAAR) 100 mg tablet Take 1 Tab by mouth daily. 90 Tab 1    CALCIUM CARBONATE (CALCIUM 500 PO) Take 1 Tab by mouth two (2) times a day.  ergocalciferol (VITAMIN D2) 50,000 unit capsule Take 50,000 Units by mouth every thirty (30) days. Takes 1st of month      OMEPRAZOLE PO Take 20 mg by mouth daily.  FERROUS FUMARATE (IRON PO) Take 325 mg by mouth daily (after dinner).  FOLIC ACID/MULTIVIT-MIN/LUTEIN (CENTRUM SILVER PO) Take 1 Tab by mouth daily.  triamterene-hydrochlorothiazide (DYAZIDE) 37.5-25 mg per capsule Take 1 Cap by mouth daily.       loratadine (CLARITIN) 10 mg tablet TK 1 T PO QD FOR ALLERGY SYPMTOMS  0    triamterene-hydroCHLOROthiazide (MAXZIDE) 37.5-25 mg per tablet TK 1 T PO QD  3     No Known Allergies  Family History   Problem Relation Age of Onset    Heart Disease Father     Stroke Father     Hypertension Father     Cancer Brother      lung    Cancer Brother      lung     Social History   Substance Use Topics    Smoking status: Never Smoker    Smokeless tobacco: Never Used    Alcohol use Yes      Comment: less than 1 beer/month     Patient Active Problem List   Diagnosis Code    Rotator cuff tear M75.100    Biceps rupture, proximal S46.119A    Rotator cuff tear arthropathy of right shoulder M12.811    Primary localized osteoarthrosis, lower leg M17.10    Hypertension I10    DM type 2 [facility]di[facility] mellitus[facility] type 2) ([facility]) E11.9    Renal insufficiency N28.9    GERD (gastroesophageal reflux disease) K21.9       Depression Risk Factor Screening:     PHQ over the last two weeks 7/6/2017   Little interest or pleasure in doing things Not at all   Feeling down, depressed or hopeless Not at all   Total Score PHQ 2 0     Alcohol Risk Factor Screening: On any occasion during the past 3 months, have you had more than 4 drinks containing alcohol? No    Do you average more than 14 drinks per week? No        Functional Ability and Level of Safety:     Hearing Loss   normal-to-mild    Activities of Daily Living   Self-care. Requires assistance with: no ADLs    Fall Risk   Fall Risk Assessment, last 12 mths 7/6/2017   Able to walk? Yes   Fall in past 12 months? No     Abuse Screen   Patient is not abused    Review of Systems   Not required    Physical Examination     Evaluation of Cognitive Function:  Mood/affect:  neutral  Appearance: age appropriate  Family member/caregiver input: none present    No exam performed today, MWV today.     Patient Care Team:  Luis Antonio Dickerson MD as PCP - General (Internal Medicine)    Advice/Referrals/Counseling   Education and counseling provided:  Advance Medical Directive Influenza Vaccine  Pneumonia Vaccine  Colorectal Screening  Glaucoma Screening          Assessment/Plan       ICD-10-CM ICD-9-CM    1. Dermatitis L30.9 692.9    .  1. NN discussed with patient about an Advanced Medical Directive. Provided patient blank Advanced Medical Directive and 'Your Right to Decide' Booklet. NN reviewed Advanced Medical Directive paperwork with patient with a brief description of how to complete the form. Requested that if document completed, to please provide a copy of completed Advanced Medical Directive to PCP office. Patient verbalized understanding. 2. Patient is up to date on the following immunizations:  Immunization History   Administered Date(s) Administered    Influenza High Dose Vaccine PF 10/18/2016     3. Patient wears corrective lenses. Patient reports having had a routine eye exam and glaucoma screening this year with [facility]. Will fax over a MABEL to obtain records with the patients verbal approval.    4. See depression screening, abuse screening and fall risk assessment section above. Patient verbalized understanding of information presented and was given and opportunity to ask questions. Patient provided AVS, either a printed version or electronic version in [employer], which includes Medicare Wellness Preventative Screening Table. Medication reconciliation completed by MA/ LPN and reviewed by PCP. Please bring a copy of your completed advance medical directive to the office so it may be added to your medical record. Thank you.

## 2017-07-06 NOTE — MR AVS SNAPSHOT
Visit Information Date & Time Provider Department Dept. Phone Encounter #  
 7/6/2017 10:45 AM Derrick Durham MD [facility] [address] [id number] Upcoming Health Maintenance Date Due  
 EYE EXAM RETINAL OR DILATED Q1 12/18/1954 DTaP/Tdap/Td series (1 - Tdap) 12/18/1965 FOBT Q 1 YEAR AGE 50-75 12/18/1994 ZOSTER VACCINE AGE 60> 12/18/2004 GLAUCOMA SCREENING Q2Y 12/18/2009 Pneumococcal 65+ Low/Medium Risk (1 of 2 - PCV13) 12/18/2009 MEDICARE YEARLY EXAM 12/18/2009 INFLUENZA AGE 9 TO ADULT 8/1/2017 MICROALBUMIN Q1 10/18/2017 HEMOGLOBIN A1C Q6M 11/1/2017 FOOT EXAM Q1 1/19/2018 LIPID PANEL Q1 1/19/2018 Allergies as of 7/6/2017  Review Complete On: 7/6/2017 By: Cara Gonzalez LPN No Known Allergies Current Immunizations  Never Reviewed Name Date Influenza High Dose Vaccine PF 10/18/2016 10:48 AM  
  
 Not reviewed this visit You Were Diagnosed With   
  
 Codes Comments Dermatitis    -  Primary ICD-10-CM: L30.9 ICD-9-CM: 692.9 Vitals BP Pulse Temp Resp Height(growth percentile) Weight(growth percentile) 136/81 (BP 1 Location: Left arm, BP Patient Position: Sitting) 68 98 °F (36.7 °C) (Oral) 10 5' 9\" (1.753 m) 195 lb (88.5 kg) SpO2 BMI Smoking Status 95% 28.8 kg/m2 Never Smoker Vitals History BMI and BSA Data Body Mass Index Body Surface Area  
 28.8 kg/m 2 2.08 m 2 Preferred Pharmacy Pharmacy Name Phone [address] AT [facility] [phone] Your Updated Medication List  
  
   
This list is accurate as of: 7/6/17 11:31 AM.  Always use your most recent med list.  
  
  
  
  
 BD Single Use Swabs Regular Padm Generic drug:  alcohol swabs CHECK BLOOD SUGAR DAILY CALCIUM 500 PO Take 1 Tab by mouth two (2) times a day. CENTRUM SILVER PO Take 1 Tab by mouth daily. IRON PO Take 325 mg by mouth daily (after dinner). JANUVIA 100 mg tablet Generic drug:  SITagliptin Take 100 mg by mouth daily. loratadine 10 mg tablet Commonly known as:  CLARITIN  
TK 1 T PO QD FOR ALLERGY SYPMTOMS  
  
 losartan 100 mg tablet Commonly known as:  COZAAR Take 1 Tab by mouth daily. metFORMIN 1,000 mg tablet Commonly known as:  GLUCOPHAGE Take 1 Tab by mouth two (2) times daily (with meals). OMEPRAZOLE PO Take 20 mg by mouth daily. [facility] LANCETS [facility] Generic drug:  lancets CHECK BLOOD SUGAR DAILY  
  
 ONETOUCH ULTRA TEST strip Generic drug:  glucose blood VI test strips CHECK BLOOD SUGAR DAILY  
  
 traMADol 50 mg tablet Commonly known as:  ULTRAM  
Take 1 Tab by mouth two (2) times daily as needed for Pain. Max Daily Amount: 100 mg.  
  
 * triamterene-hydroCHLOROthiazide 37.5-25 mg per capsule Commonly known as:  Violet Rosin Take 1 Cap by mouth daily. * triamterene-hydroCHLOROthiazide 37.5-25 mg per tablet Commonly known as:  Harsh Shira TK 1 T PO QD  
  
 VITAMIN D2 50,000 unit capsule Generic drug:  ergocalciferol Take 50,000 Units by mouth every thirty (30) days. Takes 1st of month * Notice: This list has 2 medication(s) that are the same as other medications prescribed for you. Read the directions carefully, and ask your doctor or other care provider to review them with you. Patient Instructions Dermatitis: Care Instructions Your Care Instructions Dermatitis is the general name used for any rash or inflammation of the skin. Different kinds of dermatitis cause different kinds of rashes. Common causes of a rash include new medicines, plants (such as poison oak or poison ivy), heat, and stress. Certain illnesses can also cause a rash. An allergic reaction to something that touches your skin, such as latex, nickel, or poison ivy, is called contact dermatitis. Contact dermatitis may also be caused by something that irritates the skin, such as bleach, a chemical, or soap. These types of rashes cannot be spread from person to person. How long your rash will last depends on what caused it. Rashes may last a few days or months. Follow-up care is a key part of your treatment and safety. Be sure to make and go to all appointments, and call your doctor if you are having problems. It's also a good idea to know your test results and keep a list of the medicines you take. How can you care for yourself at home? · Do not scratch the rash. Cut your nails short, and file them smooth. Or wear gloves if this helps keep you from scratching. · Wash the area with water only. Pat dry. · Put cold, wet cloths on the rash to reduce itching. · Keep cool, and stay out of the sun. · Leave the rash open to the air as much as possible. · If the rash itches, use hydrocortisone cream. Follow the directions on the label. Calamine lotion may help for plant rashes. · Take an over-the-counter antihistamine, such as diphenhydramine (Benadryl) or loratadine (Claritin), to help calm the itching. Read and follow all instructions on the label. · If your doctor prescribed a cream, use it as directed. If your doctor prescribed medicine, take it exactly as directed. When should you call for help? Call your doctor now or seek immediate medical care if: 
· You have symptoms of infection, such as: 
¨ Increased pain, swelling, warmth, or redness. ¨ Red streaks leading from the area. ¨ Pus draining from the area. ¨ A fever. · You have joint pain along with the rash. Watch closely for changes in your health, and be sure to contact your doctor if: 
· Your rash is changing or getting worse. · You are not getting better as expected. Where can you learn more? Go to http://anselmo-salbador.info/. Enter [phone] [phone] in the search box to learn more about \"Dermatitis: Care Instructions. \" Current as of: October 13, 2016 Content Version: 11.3 © 9376-5784 iMedia[employer]fm, "[employer]". Care instructions adapted under license by [employer] (which disclaims liability or warranty for this information). If you have questions about a medical condition or this instruction, always ask your healthcare professional. [address] any warranty or liability for your use of this information. Introducing [facility] & HEALTH SERVICES! Liseth Sagastume introduces [employer] patient portal. Now you can access parts of your medical record, email your doctor's office, and request medication refills online. 1. In your internet browser, go to [email or web address] 2. Click on the First Time User? Click Here link in the Sign In box. You will see the New Member Sign Up page. 3. Enter your [employer] Access Code exactly as it appears below. You will not need to use this code after youve completed the sign-up process. If you do not sign up before the expiration date, you must request a new code. · [employer] Access Code: [email or web address] Expires: 7/30/2017  4:30 PM 
 
4. Enter the last four digits of your Social Security Number (xxxx) and Date of Birth (mm/dd/yyyy) as indicated and click Submit. You will be taken to the next sign-up page. 5. Create a [employer] ID. This will be your [employer] login ID and cannot be changed, so think of one that is secure and easy to remember. 6. Create a [employer] password. You can change your password at any time. 7. Enter your Password Reset Question and Answer. This can be used at a later time if you forget your password. 8. Enter your e-mail address. You will receive e-mail notification when new information is available in [address] 9. Click Sign Up. You can now view and download portions of your medical record. 10. Click the Download Summary menu link to download a portable copy of your medical information.  
 
If you have questions, please visit the Frequently Asked Questions section of the [employer] website. Remember, [employer] is NOT to be used for urgent needs. For medical emergencies, dial 911. Now available from your iPhone and Android! Please provide this summary of care documentation to your next provider. Your primary care clinician is listed as Abhishek . If you have any questions after today's visit, please call [phone].

## 2017-08-08 ENCOUNTER — HOSPITAL ENCOUNTER (OUTPATIENT)
Dept: LAB | Age: 72
Discharge: HOME | End: 2017-08-08
Payer: MEDICARE

## 2017-08-08 ENCOUNTER — OFFICE VISIT (OUTPATIENT)
Dept: INTERNAL MEDICINE CLINIC | Age: 72
End: 2017-08-08

## 2017-08-08 VITALS
OXYGEN SATURATION: 97 % | WEIGHT: 197 LBS | HEIGHT: 69 IN | RESPIRATION RATE: 16 BRPM | HEART RATE: 61 BPM | BODY MASS INDEX: 29.18 KG/M2 | TEMPERATURE: 97.8 F | SYSTOLIC BLOOD PRESSURE: 131 MMHG | DIASTOLIC BLOOD PRESSURE: 73 MMHG

## 2017-08-08 DIAGNOSIS — M25.562 CHRONIC PAIN OF LEFT KNEE: ICD-10-CM

## 2017-08-08 DIAGNOSIS — K21.9 GASTROESOPHAGEAL REFLUX DISEASE WITHOUT ESOPHAGITIS: Chronic | ICD-10-CM

## 2017-08-08 DIAGNOSIS — I10 ESSENTIAL HYPERTENSION: Chronic | ICD-10-CM

## 2017-08-08 DIAGNOSIS — E11.8 TYPE 2 DIABETES MELLITUS WITH COMPLICATION, WITHOUT LONG-TERM CURRENT USE OF INSULIN (HCC): Primary | Chronic | ICD-10-CM

## 2017-08-08 DIAGNOSIS — G89.29 CHRONIC PAIN OF LEFT KNEE: ICD-10-CM

## 2017-08-08 DIAGNOSIS — E55.9 VITAMIN D DEFICIENCY: ICD-10-CM

## 2017-08-08 PROCEDURE — 80048 BASIC METABOLIC PNL TOTAL CA: CPT

## 2017-08-08 PROCEDURE — 83036 HEMOGLOBIN GLYCOSYLATED A1C: CPT

## 2017-08-08 PROCEDURE — 82306 VITAMIN D 25 HYDROXY: CPT

## 2017-08-08 RX ORDER — TRAMADOL HYDROCHLORIDE 50 MG/1
50 TABLET ORAL
Qty: 180 TAB | Refills: 0 | Status: SHIPPED | OUTPATIENT
Start: 2017-08-08 | End: 2017-11-08 | Stop reason: SDUPTHER

## 2017-08-08 RX ORDER — OMEPRAZOLE 20 MG/1
CAPSULE, DELAYED RELEASE ORAL
Refills: 2 | COMMUNITY
Start: 2017-05-27 | End: 2017-08-08 | Stop reason: SDUPTHER

## 2017-08-08 RX ORDER — ERGOCALCIFEROL 1.25 MG/1
50000 CAPSULE ORAL
Qty: 12 CAP | Refills: 3 | Status: SHIPPED | OUTPATIENT
Start: 2017-08-08 | End: 2018-08-10 | Stop reason: SDUPTHER

## 2017-08-08 RX ORDER — OMEPRAZOLE 20 MG/1
CAPSULE, DELAYED RELEASE ORAL
Qty: 90 CAP | Refills: 2 | Status: SHIPPED | OUTPATIENT
Start: 2017-08-08 | End: 2018-05-24 | Stop reason: SDUPTHER

## 2017-08-08 RX ORDER — LOSARTAN POTASSIUM 100 MG/1
100 TABLET ORAL DAILY
Qty: 90 TAB | Refills: 1 | Status: SHIPPED | OUTPATIENT
Start: 2017-08-08 | End: 2018-02-28 | Stop reason: SDUPTHER

## 2017-08-08 RX ORDER — TRIAMTERENE/HYDROCHLOROTHIAZID 37.5-25 MG
TABLET ORAL
Qty: 90 TAB | Refills: 3 | Status: SHIPPED | OUTPATIENT
Start: 2017-08-08 | End: 2018-08-10 | Stop reason: SDUPTHER

## 2017-08-08 NOTE — MR AVS SNAPSHOT
Visit Information Date & Time Provider Department Dept. Phone Encounter #  
 8/8/2017  8:00 AM Eduardo Uribe MD [facility] [address] [id number] Follow-up Instructions Return in about 3 months (around 11/8/2017). Upcoming Health Maintenance Date Due  
 EYE EXAM RETINAL OR DILATED Q1 12/18/1954 DTaP/Tdap/Td series (1 - Tdap) 12/18/1965 ZOSTER VACCINE AGE 60> 10/18/2004 GLAUCOMA SCREENING Q2Y 12/18/2009 Pneumococcal 65+ Low/Medium Risk (1 of 2 - PCV13) 12/18/2009 INFLUENZA AGE 9 TO ADULT 8/1/2017 MICROALBUMIN Q1 10/18/2017 HEMOGLOBIN A1C Q6M 11/1/2017 FOOT EXAM Q1 1/19/2018 LIPID PANEL Q1 1/19/2018 MEDICARE YEARLY EXAM 7/7/2018 COLONOSCOPY 8/11/2020 Allergies as of 8/8/2017  Review Complete On: 1/3/2027 By: Jolena Labrum Wears No Known Allergies Current Immunizations  Never Reviewed Name Date Influenza High Dose Vaccine PF 10/18/2016 10:48 AM  
  
 Not reviewed this visit You Were Diagnosed With   
  
 Codes Comments Type 2 diabetes mellitus with complication, without long-term current use of insulin ([facility])    -  Primary ICD-10-CM: E11.8 ICD-9-CM: 250.90 Chronic pain of left knee     ICD-10-CM: M25.562, G89.29 ICD-9-CM: 719.46, 338.29 Essential hypertension     ICD-10-CM: I10 
ICD-9-CM: 401.9 Vitamin D deficiency     ICD-10-CM: E55.9 ICD-9-CM: 268.9 Gastroesophageal reflux disease without esophagitis     ICD-10-CM: K21.9 ICD-9-CM: 530.81 Vitals BP Pulse Temp Resp Height(growth percentile) Weight(growth percentile) 131/73 (BP 1 Location: Left arm, BP Patient Position: Sitting) 61 97.8 °F (36.6 °C) (Oral) 16 5' 9\" (1.753 m) 197 lb (89.4 kg) SpO2 BMI Smoking Status 97% 29.09 kg/m2 Never Smoker Vitals History BMI and BSA Data Body Mass Index Body Surface Area  
 29.09 kg/m 2 2.09 m 2 Preferred Pharmacy Pharmacy Name Phone [address] AT [facility] OF  [facility] [phone] Your Updated Medication List  
  
   
This list is accurate as of: 8/8/17  8:35 AM.  Always use your most recent med list.  
  
  
  
  
 BD Single Use Swabs Regular Padm Generic drug:  alcohol swabs CHECK BLOOD SUGAR DAILY CALCIUM 500 PO Take 1 Tab by mouth two (2) times a day. CENTRUM SILVER PO Take 1 Tab by mouth daily. ergocalciferol 50,000 unit capsule Commonly known as:  VITAMIN D2 Take 1 Cap by mouth every thirty (30) days. Takes 1st of month IRON PO Take 325 mg by mouth daily (after dinner). JANUVIA 100 mg tablet Generic drug:  SITagliptin Take 100 mg by mouth daily. loratadine 10 mg tablet Commonly known as:  CLARITIN  
TK 1 T PO QD FOR ALLERGY SYPMTOMS  
  
 losartan 100 mg tablet Commonly known as:  COZAAR Take 1 Tab by mouth daily. metFORMIN 1,000 mg tablet Commonly known as:  GLUCOPHAGE  
TAKE 1 TABLET BY MOUTH TWICE DAILY WITH MEALS  
  
 omeprazole 20 mg capsule Commonly known as:  PRILOSEC TK 1 C PO QD  
  
 ONETOUCH DELICA LANCETS 30 gauge Misc Generic drug:  lancets CHECK BLOOD SUGAR DAILY  
  
 ONETOUCH ULTRA TEST strip Generic drug:  glucose blood VI test strips CHECK BLOOD SUGAR DAILY  
  
 traMADol 50 mg tablet Commonly known as:  ULTRAM  
Take 1 Tab by mouth two (2) times daily as needed for Pain. Max Daily Amount: 100 mg.  
  
 * triamterene-hydroCHLOROthiazide 37.5-25 mg per capsule Commonly known as:  Francena Cruel Take 1 Cap by mouth daily. * triamterene-hydroCHLOROthiazide 37.5-25 mg per tablet Commonly known as:  Pink Kayla TK 1 T PO QD  
  
 * Notice: This list has 2 medication(s) that are the same as other medications prescribed for you. Read the directions carefully, and ask your doctor or other care provider to review them with you. Prescriptions Printed Refills  
 traMADol (ULTRAM) 50 mg tablet 0 Sig: Take 1 Tab by mouth two (2) times daily as needed for Pain. Max Daily Amount: 100 mg. Class: Print Route: Oral  
  
Prescriptions Sent to Pharmacy Refills  
 losartan (COZAAR) 100 mg tablet 1 Sig: Take 1 Tab by mouth daily. Class: Normal  
 Pharmacy: [facility] [address] [facility] Ph #: [phone] Route: Oral  
 ergocalciferol (VITAMIN D2) 50,000 unit capsule 3 Sig: Take 1 Cap by mouth every thirty (30) days. Takes 1st of month Class: Normal  
 Pharmacy: [facility] [address] [place] [address] Ph #: [phone] Route: Oral  
 omeprazole (PRILOSEC) 20 mg capsule 2 Sig: TK 1 C PO QD Class: Normal  
 Pharmacy: [facility] [address] [facility] Ph #: [phone]  
 triamterene-hydroCHLOROthiazide (MAXZIDE) 37.5-25 mg per tablet 3 Sig: TK 1 T PO QD Class: Normal  
 Pharmacy: [facility] [address] [place] [address] Ph #: [phone] We Performed the Following HEMOGLOBIN A1C WITH EAG [71970 CPT(R)] METABOLIC PANEL, BASIC [03978 CPT(R)] VITAMIN D, 25 HYDROXY [id number] CPT(R)] Follow-up Instructions Return in about 3 months (around 11/8/2017). Introducing [facility] & HEALTH SERVICES! Garret Jordan introduces [employer] patient portal. Now you can access parts of your medical record, email your doctor's office, and request medication refills online. 1. In your internet browser, go to [email or web address] 2. Click on the First Time User? Click Here link in the Sign In box. You will see the New Member Sign Up page. 3. Enter your [employer] Access Code exactly as it appears below.  You will not need to use this code after youve completed the sign-up process. If you do not sign up before the expiration date, you must request a new code. · [employer] Access Code: [email or web address] Expires: 11/6/2017  8:35 AM 
 
4. Enter the last four digits of your Social Security Number (xxxx) and Date of Birth (mm/dd/yyyy) as indicated and click Submit. You will be taken to the next sign-up page. 5. Create a [employer] ID. This will be your [employer] login ID and cannot be changed, so think of one that is secure and easy to remember. 6. Create a [employer] password. You can change your password at any time. 7. Enter your Password Reset Question and Answer. This can be used at a later time if you forget your password. 8. Enter your e-mail address. You will receive e-mail notification when new information is available in [address] 9. Click Sign Up. You can now view and download portions of your medical record. 10. Click the Download Summary menu link to download a portable copy of your medical information. If you have questions, please visit the Frequently Asked Questions section of the [employer] website. Remember, [employer] is NOT to be used for urgent needs. For medical emergencies, dial 911. Now available from your iPhone and Android! Please provide this summary of care documentation to your next provider. Your primary care clinician is listed as Stephanie Girard. If you have any questions after today's visit, please call [phone].

## 2017-08-08 NOTE — PROGRESS NOTES
Roderick Lawrence is a 66 y.o. male who presents today for Diabetes      He has a history of   Patient Active Problem List   Diagnosis Code    Rotator cuff tear M75.100    Biceps rupture, proximal S46.119A    Rotator cuff tear arthropathy of right shoulder M12.811    Primary localized osteoarthrosis, lower leg M17.10    Hypertension I10    DM type 2 (diabetes mellitus, type 2) ([facility] [address] E11.9    Renal insufficiency N28.9    GERD (gastroesophageal reflux disease) K21.9    Vitamin D deficiency E55.9   . Today patient is here for f/u.   he does not have other concerns. Rash from previous visit has resolved. Diabetes Mellitus follow-up - on metformin and januvia. Will be in the doughnut hole soon as cost is getting worse. This will cost him >1000$. Last hemoglobin a1c   Lab Results   Component Value Date/Time    Hemoglobin A1c 6.3 05/01/2017 12:00 AM     No components found for: POCA1C, HBA1C POC  medication compliance: compliant all of the time. Diabetic diet compliance: compliant most of the time. Home glucose monitoring: fasting values range . Hypoglycemic episodes:  none. Further diabetic ROS: no polyuria or polydipsia, no chest pain, dyspnea or TIA's, no numbness, tingling or pain in extremities. Diabetes Complications: None  [facility]   Last Foot Exam: UTD  Microalbuminuria:   Lab Results   Component Value Date/Time    Microalb/Creat ratio (ug/mg creat.) <2.8 10/18/2016 11:05 AM     Hypertension - on maxide and cozaar. Hypertension ROS: taking medications as instructed, no medication side effects noted, no TIA's, no chest pain on exertion, no dyspnea on exertion, no swelling of ankles     reports that he has never smoked. He has never used smokeless tobacco.    reports that he drinks alcohol. BP Readings from Last 2 Encounters:   08/08/17 131/73   07/06/17 136/81       ROS  Review of Systems   Constitutional: Negative for chills, fever and weight loss. Respiratory: Negative for cough and shortness of breath. Cardiovascular: Negative for chest pain, palpitations and leg swelling. Gastrointestinal: Negative for abdominal pain, nausea and vomiting. Genitourinary: Negative for dysuria, frequency and urgency. Neurological: Negative. Endo/Heme/Allergies: Does not bruise/bleed easily. Psychiatric/Behavioral: Negative for depression. The patient is not nervous/anxious. Visit Vitals    /73 (BP 1 Location: Left arm, BP Patient Position: Sitting)    Pulse 61    Temp 97.8 °F (36.6 °C) (Oral)    Resp 16    Ht 5' 9\" (1.753 m)    Wt 197 lb (89.4 kg)    SpO2 97%    BMI 29.09 kg/m2       Physical Exam   Constitutional: He is oriented to person, place, and time. He appears well-developed and well-nourished. HENT:   Head: Normocephalic and atraumatic. Eyes: EOM are normal. Pupils are equal, round, and reactive to light. Cardiovascular: Normal rate and regular rhythm. No murmur heard. Pulmonary/Chest: Effort normal and breath sounds normal. No respiratory distress. Abdominal: Soft. Bowel sounds are normal. He exhibits no distension. There is no tenderness. Musculoskeletal: Normal range of motion. He exhibits no edema. Neurological: He is alert and oriented to person, place, and time. Skin: Skin is warm and dry. He is not diaphoretic. Psychiatric: He has a normal mood and affect. His behavior is normal.         Current Outpatient Prescriptions   Medication Sig    traMADol (ULTRAM) 50 mg tablet Take 1 Tab by mouth two (2) times daily as needed for Pain. Max Daily Amount: 100 mg.    losartan (COZAAR) 100 mg tablet Take 1 Tab by mouth daily.  ergocalciferol (VITAMIN D2) 50,000 unit capsule Take 1 Cap by mouth every thirty (30) days.  Takes 1st of month    omeprazole (PRILOSEC) 20 mg capsule TK 1 C PO QD    triamterene-hydroCHLOROthiazide (MAXZIDE) 37.5-25 mg per tablet TK 1 T PO QD    metFORMIN (GLUCOPHAGE) 1,000 mg tablet TAKE 1 TABLET BY MOUTH TWICE DAILY WITH MEALS    ONETOUCH DELICA LANCETS 30 gauge misc CHECK BLOOD SUGAR DAILY    BD SINGLE USE SWABS REGULAR padm CHECK BLOOD SUGAR DAILY    ONETOUCH ULTRA TEST strip CHECK BLOOD SUGAR DAILY    SITagliptin (JANUVIA) 100 mg tablet Take 100 mg by mouth daily.  CALCIUM CARBONATE (CALCIUM 500 PO) Take 1 Tab by mouth two (2) times a day.  FERROUS FUMARATE (IRON PO) Take 325 mg by mouth daily (after dinner).  FOLIC ACID/MULTIVIT-MIN/LUTEIN (CENTRUM SILVER PO) Take 1 Tab by mouth daily.  loratadine (CLARITIN) 10 mg tablet TK 1 T PO QD FOR ALLERGY SYPMTOMS    triamterene-hydrochlorothiazide (DYAZIDE) 37.5-25 mg per capsule Take 1 Cap by mouth daily. No current facility-administered medications for this visit. Past Medical History:   Diagnosis Date    Arthritis     Diabetes ([address]     GERD (gastroesophageal reflux disease)     Hypertension     Nausea & vomiting       Past Surgical History:   Procedure Laterality Date    HX CATARACT REMOVAL Bilateral 9/2012    HX KNEE ARTHROSCOPY Left     HX MOHS PROCEDURES Right 3/2015    and bicep tear repair    HX ORTHOPAEDIC Left 1/2012    total knee replacement      Social History   Substance Use Topics    Smoking status: Never Smoker    Smokeless tobacco: Never Used    Alcohol use Yes      Comment: less than 1 beer/month      Family History   Problem Relation Age of Onset    Heart Disease Father     Stroke Father     Hypertension Father     Cancer Brother      lung    Cancer Brother      lung        No Known Allergies     Assessment/Plan  Diagnoses and all orders for this visit:    1. Type 2 diabetes mellitus with complication, without long-term current use of insulin ([address] - Cost with [employer] is an issue. On a fixed income. Will see if they qualify for pt assistance. -     HEMOGLOBIN A1C WITH EAG  -     METABOLIC PANEL, BASIC    2.  Chronic pain of left knee - BID tramadol.   -     traMADol (ULTRAM) 50 mg tablet; Take 1 Tab by mouth two (2) times daily as needed for Pain. Max Daily Amount: 100 mg.    3. Essential hypertension - Good control, continue this. -     losartan (COZAAR) 100 mg tablet; Take 1 Tab by mouth daily. -     triamterene-hydroCHLOROthiazide (MAXZIDE) 37.5-25 mg per tablet; TK 1 T PO QD    4. Vitamin D deficiency  -     ergocalciferol (VITAMIN D2) 50,000 unit capsule; Take 1 Cap by mouth every thirty (30) days. Takes 1st of month  -     VITAMIN D, 25 HYDROXY    5. Gastroesophageal reflux disease without esophagitis  -     omeprazole (PRILOSEC) 20 mg capsule; TK 1 C PO QD        Follow-up Disposition:  Return in about 3 months (around 11/8/2017).     Clay Castañeda MD  8/8/2017

## 2017-08-08 NOTE — Clinical Note
Can you contact him for the pt assistance for Jennifer. Has been doing great on this, but will cost him a lot soon. On a fixed income.   Thanks

## 2017-08-09 LAB
25(OH)D3+25(OH)D2 SERPL-MCNC: 36.6 NG/ML (ref 30–100)
BUN SERPL-MCNC: 24 MG/DL (ref 8–27)
BUN/CREAT SERPL: 20 (ref 10–24)
CALCIUM SERPL-MCNC: 9.5 MG/DL (ref 8.6–10.2)
CHLORIDE SERPL-SCNC: 100 MMOL/L (ref 96–106)
CO2 SERPL-SCNC: 25 MMOL/L (ref 18–29)
CREAT SERPL-MCNC: 1.18 MG/DL (ref 0.76–1.27)
EST. AVERAGE GLUCOSE BLD GHB EST-MCNC: 131 MG/DL
GLUCOSE SERPL-MCNC: 117 MG/DL (ref 65–99)
HBA1C MFR BLD: 6.2 % (ref 4.8–5.6)
POTASSIUM SERPL-SCNC: 4.7 MMOL/L (ref 3.5–5.2)
SODIUM SERPL-SCNC: 141 MMOL/L (ref 134–144)

## 2017-11-08 ENCOUNTER — OFFICE VISIT (OUTPATIENT)
Dept: INTERNAL MEDICINE CLINIC | Age: 72
End: 2017-11-08

## 2017-11-08 ENCOUNTER — HOSPITAL ENCOUNTER (OUTPATIENT)
Dept: LAB | Age: 72
Discharge: HOME | End: 2017-11-08
Payer: MEDICARE

## 2017-11-08 VITALS
OXYGEN SATURATION: 94 % | HEART RATE: 67 BPM | DIASTOLIC BLOOD PRESSURE: 80 MMHG | HEIGHT: 69 IN | WEIGHT: 201 LBS | TEMPERATURE: 98.4 F | SYSTOLIC BLOOD PRESSURE: 130 MMHG | RESPIRATION RATE: 12 BRPM | BODY MASS INDEX: 29.77 KG/M2

## 2017-11-08 DIAGNOSIS — M25.562 CHRONIC PAIN OF LEFT KNEE: ICD-10-CM

## 2017-11-08 DIAGNOSIS — E11.8 TYPE 2 DIABETES MELLITUS WITH COMPLICATION, WITHOUT LONG-TERM CURRENT USE OF INSULIN (HCC): Chronic | ICD-10-CM

## 2017-11-08 DIAGNOSIS — G89.29 CHRONIC PAIN OF LEFT KNEE: ICD-10-CM

## 2017-11-08 DIAGNOSIS — E78.5 HYPERLIPIDEMIA, UNSPECIFIED HYPERLIPIDEMIA TYPE: ICD-10-CM

## 2017-11-08 DIAGNOSIS — E11.8 TYPE 2 DIABETES MELLITUS WITH COMPLICATION, WITHOUT LONG-TERM CURRENT USE OF INSULIN (HCC): Primary | Chronic | ICD-10-CM

## 2017-11-08 DIAGNOSIS — I10 ESSENTIAL HYPERTENSION: Chronic | ICD-10-CM

## 2017-11-08 DIAGNOSIS — E55.9 VITAMIN D DEFICIENCY: ICD-10-CM

## 2017-11-08 PROCEDURE — 82043 UR ALBUMIN QUANTITATIVE: CPT

## 2017-11-08 PROCEDURE — 83036 HEMOGLOBIN GLYCOSYLATED A1C: CPT

## 2017-11-08 PROCEDURE — 80048 BASIC METABOLIC PNL TOTAL CA: CPT

## 2017-11-08 RX ORDER — TRAMADOL HYDROCHLORIDE 50 MG/1
50 TABLET ORAL
Qty: 180 TAB | Refills: 0 | Status: SHIPPED | OUTPATIENT
Start: 2017-11-08 | End: 2018-01-11 | Stop reason: SDUPTHER

## 2017-11-08 RX ORDER — GLIMEPIRIDE 1 MG/1
1 TABLET ORAL
Qty: 30 TAB | Refills: 2 | Status: SHIPPED | OUTPATIENT
Start: 2017-11-08 | End: 2017-11-08 | Stop reason: SDUPTHER

## 2017-11-08 RX ORDER — GLIMEPIRIDE 1 MG/1
TABLET ORAL
Qty: 90 TAB | Refills: 2 | Status: SHIPPED | OUTPATIENT
Start: 2017-11-08 | End: 2018-08-10 | Stop reason: SDUPTHER

## 2017-11-08 NOTE — PROGRESS NOTES
Gerard Simmonds is a 66 y.o. male who presents today for Diabetes and Medication Evaluation  . He has a history of   Patient Active Problem List   Diagnosis Code    Rotator cuff tear M75.100    Biceps rupture, proximal S46.119A    Rotator cuff tear arthropathy of right shoulder M12.811    Primary localized osteoarthrosis, lower leg M17.10    Hypertension I10    DM type 2 (diabetes mellitus, type 2) ([facility] [address] E11.9    Renal insufficiency N28.9    GERD (gastroesophageal reflux disease) K21.9    Vitamin D deficiency E55.9   . Today patient is here for f/u.   he does not have other concerns. Diabetes Mellitus follow-up - Very Stable. On Januvia and Metformin. Cost now an issue. Pt makes a bit too much for financial aid. Given such good numbers, will stop [place] and [place]. Last hemoglobin a1c   Lab Results   Component Value Date/Time    Hemoglobin A1c 6.2 08/08/2017 12:00 AM     No components found for: POCA1C, HBA1C POC  medication compliance: compliant all of the time. Diabetic diet compliance: compliant most of the time. Home glucose monitoring: fasting values range 120's. Hypoglycemic episodes:  one. Further diabetic ROS: no polyuria or polydipsia, no chest pain, dyspnea or TIA's, no numbness, tingling or pain in extremities. Diabetes Complications: None  Last Eye Exam: UTD  Last Foot Exam: UTD  Microalbuminuria:   Lab Results   Component Value Date/Time    Microalb/Creat ratio (ug/mg creat.) <2.8 10/18/2016 11:05 AM     Hypertension - on Maxide and ARB. Good on repeat. Hypertension ROS: taking medications as instructed, no medication side effects noted, no TIA's, no chest pain on exertion, no dyspnea on exertion, no swelling of ankles     reports that he has never smoked. He has never used smokeless tobacco.    reports that he drinks alcohol.    BP Readings from Last 2 Encounters:   11/08/17 130/80   08/08/17 131/73       ROS  Review of Systems   Constitutional: Negative for chills, fever and weight loss. Respiratory: Negative for cough and shortness of breath. Cardiovascular: Negative for chest pain, palpitations and leg swelling. Gastrointestinal: Negative for abdominal pain, nausea and vomiting. Neurological: Negative. Endo/Heme/Allergies: Does not bruise/bleed easily. Psychiatric/Behavioral: Negative for depression. The patient is not nervous/anxious. Visit Vitals    /80 (BP 1 Location: Left arm, BP Patient Position: Sitting)    Pulse 67    Temp 98.4 °F (36.9 °C) (Oral)    Resp 12    Ht 5' 9\" (1.753 m)    Wt 201 lb (91.2 kg)    SpO2 94%    BMI 29.68 kg/m2       Physical Exam   Constitutional: He is oriented to person, place, and time. He appears well-developed and well-nourished. HENT:   Head: Normocephalic and atraumatic. Eyes: EOM are normal. Pupils are equal, round, and reactive to light. Cardiovascular: Normal rate and regular rhythm. No murmur heard. Pulmonary/Chest: Effort normal and breath sounds normal. No respiratory distress. Musculoskeletal: Normal range of motion. He exhibits no edema. Neurological: He is alert and oriented to person, place, and time. Skin: Skin is warm and dry. He is not diaphoretic. Psychiatric: He has a normal mood and affect. His behavior is normal.         Current Outpatient Prescriptions   Medication Sig    glimepiride (AMARYL) 1 mg tablet Take 1 Tab by mouth Daily (before breakfast).  traMADol (ULTRAM) 50 mg tablet Take 1 Tab by mouth two (2) times daily as needed for Pain. Max Daily Amount: 100 mg.  JANUVIA 100 mg tablet TAKE 1 TABLET BY MOUTH EVERY MORNING    losartan (COZAAR) 100 mg tablet Take 1 Tab by mouth daily.  ergocalciferol (VITAMIN D2) 50,000 unit capsule Take 1 Cap by mouth every thirty (30) days.  Takes 1st of month    omeprazole (PRILOSEC) 20 mg capsule TK 1 C PO QD    triamterene-hydroCHLOROthiazide (MAXZIDE) 37.5-25 mg per tablet TK 1 T PO QD    metFORMIN (GLUCOPHAGE) 1,000 mg tablet TAKE 1 TABLET BY MOUTH TWICE DAILY WITH MEALS    ONETOUCH DELICA LANCETS 30 gauge misc CHECK BLOOD SUGAR DAILY    BD SINGLE USE SWABS REGULAR padm CHECK BLOOD SUGAR DAILY    ONETOUCH ULTRA TEST strip CHECK BLOOD SUGAR DAILY    CALCIUM CARBONATE (CALCIUM 500 PO) Take 1 Tab by mouth two (2) times a day.  FOLIC ACID/MULTIVIT-MIN/LUTEIN (CENTRUM SILVER PO) Take 1 Tab by mouth daily.  loratadine (CLARITIN) 10 mg tablet TK 1 T PO QD FOR ALLERGY SYPMTOMS    FERROUS FUMARATE (IRON PO) Take 325 mg by mouth daily (after dinner).  triamterene-hydrochlorothiazide (DYAZIDE) 37.5-25 mg per capsule Take 1 Cap by mouth daily. No current facility-administered medications for this visit. Past Medical History:   Diagnosis Date    Arthritis     Diabetes ([address]     GERD (gastroesophageal reflux disease)     Hypertension     Nausea & vomiting       Past Surgical History:   Procedure Laterality Date    HX CATARACT REMOVAL Bilateral 9/2012    HX KNEE ARTHROSCOPY Left     HX MOHS PROCEDURES Right 3/2015    and bicep tear repair    HX ORTHOPAEDIC Left 1/2012    total knee replacement      Social History   Substance Use Topics    Smoking status: Never Smoker    Smokeless tobacco: Never Used    Alcohol use Yes      Comment: less than 1 beer/month      Family History   Problem Relation Age of Onset    Heart Disease Father     Stroke Father     Hypertension Father     Cancer Brother      lung    Cancer Brother      lung        No Known Allergies     Assessment/Plan  Diagnoses and all orders for this visit:    1. Type 2 diabetes mellitus with complication, without long-term current use of insulin (HCC) - Very good control. Given cost (1200/3mos), stop [place] and [place]. Monitor BG, if begins to be >150 fasting, start low dose glimepiride. WIll repeat in 3 mos her A1c    -     HEMOGLOBIN A1C WITH EAG  -     METABOLIC PANEL, BASIC  -     glimepiride (AMARYL) 1 mg tablet;  Take 1 Tab by mouth Daily (before breakfast). -     MICROALBUMIN, UR, RAND W/ MICROALBUMIN/CREA RATIO  -     LIPID PANEL; Future  -     METABOLIC PANEL, COMPREHENSIVE; Future  -     HEMOGLOBIN A1C WITH EAG; Future    2. Essential hypertension - stable   -     METABOLIC PANEL, BASIC  -     LIPID PANEL; Future  -     METABOLIC PANEL, COMPREHENSIVE; Future    3. Vitamin D deficiency - repeat before f/u. -     VITAMIN D, 25 HYDROXY; Future    4. Hyperlipidemia, unspecified hyperlipidemia type  -     LIPID PANEL; Future  -     METABOLIC PANEL, COMPREHENSIVE; Future    5. Chronic pain of left knee - refill.   -     traMADol (ULTRAM) 50 mg tablet; Take 1 Tab by mouth two (2) times daily as needed for Pain. Max Daily Amount: 100 mg. Follow-up Disposition:  Return in about 3 months (around 2/8/2018).     Harjinder Nguyen MD  11/8/2017

## 2017-11-08 NOTE — MR AVS SNAPSHOT
Visit Information Date & Time Provider Department Dept. Phone Encounter #  
 11/8/2017 11:30 AM Rossana Angeles MD [facility] [address] [id number] Follow-up Instructions Return in about 3 months (around 2/8/2018). Upcoming Health Maintenance Date Due DTaP/Tdap/Td series (1 - Tdap) 12/18/1965 ZOSTER VACCINE AGE 60> 10/18/2004 Pneumococcal 65+ Low/Medium Risk (1 of 2 - PCV13) 12/18/2009 Influenza Age 5 to Adult 8/1/2017 MICROALBUMIN Q1 10/18/2017 FOOT EXAM Q1 1/19/2018 LIPID PANEL Q1 1/19/2018 HEMOGLOBIN A1C Q6M 2/8/2018 EYE EXAM RETINAL OR DILATED Q1 6/30/2018 MEDICARE YEARLY EXAM 7/7/2018 GLAUCOMA SCREENING Q2Y 6/30/2019 COLONOSCOPY 8/11/2020 Allergies as of 11/8/2017  Review Complete On: 11/8/2017 By: Julisa Rushing LPN No Known Allergies Current Immunizations  Never Reviewed Name Date Influenza High Dose Vaccine PF 10/18/2016 10:48 AM  
  
 Not reviewed this visit You Were Diagnosed With   
  
 Codes Comments Type 2 diabetes mellitus with complication, without long-term current use of insulin (HCC)    -  Primary ICD-10-CM: E11.8 ICD-9-CM: 250.90 Essential hypertension     ICD-10-CM: I10 
ICD-9-CM: 401.9 Vitamin D deficiency     ICD-10-CM: E55.9 ICD-9-CM: 268.9 Hyperlipidemia, unspecified hyperlipidemia type     ICD-10-CM: E78.5 ICD-9-CM: 272.4 Vitals BP Pulse Temp Resp Height(growth percentile) Weight(growth percentile) 130/80 (BP 1 Location: Left arm, BP Patient Position: Sitting) 67 98.4 °F (36.9 °C) (Oral) 12 5' 9\" (1.753 m) 201 lb (91.2 kg) SpO2 BMI Smoking Status 94% 29.68 kg/m2 Never Smoker Vitals History BMI and BSA Data Body Mass Index Body Surface Area  
 29.68 kg/m 2 2.11 m 2 Preferred Pharmacy Pharmacy Name Phone  [facility] [address] [facility] [phone] Your Updated Medication List  
  
   
This list is accurate as of: 11/8/17 12:05 PM.  Always use your most recent med list.  
  
  
  
  
 BD Single Use Swabs Regular Padm Generic drug:  alcohol swabs CHECK BLOOD SUGAR DAILY CALCIUM 500 PO Take 1 Tab by mouth two (2) times a day. CENTRUM SILVER PO Take 1 Tab by mouth daily. ergocalciferol 50,000 unit capsule Commonly known as:  VITAMIN D2 Take 1 Cap by mouth every thirty (30) days. Takes 1st of month  
  
 glimepiride 1 mg tablet Commonly known as:  AMARYL Take 1 Tab by mouth Daily (before breakfast). IRON PO Take 325 mg by mouth daily (after dinner). JANUVIA 100 mg tablet Generic drug:  SITagliptin TAKE 1 TABLET BY MOUTH EVERY MORNING  
  
 loratadine 10 mg tablet Commonly known as:  CLARITIN  
TK 1 T PO QD FOR ALLERGY SYPMTOMS  
  
 losartan 100 mg tablet Commonly known as:  COZAAR Take 1 Tab by mouth daily. metFORMIN 1,000 mg tablet Commonly known as:  GLUCOPHAGE  
TAKE 1 TABLET BY MOUTH TWICE DAILY WITH MEALS  
  
 omeprazole 20 mg capsule Commonly known as:  PRILOSEC TK 1 C PO QD  
  
 ONETOUCH DELICA LANCETS 30 gauge Misc Generic drug:  lancets CHECK BLOOD SUGAR DAILY  
  
 ONETOUCH ULTRA TEST strip Generic drug:  glucose blood VI test strips CHECK BLOOD SUGAR DAILY  
  
 traMADol 50 mg tablet Commonly known as:  ULTRAM  
Take 1 Tab by mouth two (2) times daily as needed for Pain. Max Daily Amount: 100 mg.  
  
 * triamterene-hydroCHLOROthiazide 37.5-25 mg per capsule Commonly known as:  Jarrod Yoder Take 1 Cap by mouth daily. * triamterene-hydroCHLOROthiazide 37.5-25 mg per tablet Commonly known as:  Debborah American TK 1 T PO QD  
  
 * Notice: This list has 2 medication(s) that are the same as other medications prescribed for you. Read the directions carefully, and ask your doctor or other care provider to review them with you. Prescriptions Sent to Pharmacy Refills  
 glimepiride (AMARYL) 1 mg tablet 2 Sig: Take 1 Tab by mouth Daily (before breakfast). Class: Normal  
 Pharmacy: [facility] [place], [address] [facility] #: [phone] Route: Oral  
  
We Performed the Following HEMOGLOBIN A1C WITH EAG [73204 CPT(R)] METABOLIC PANEL, BASIC [25951 CPT(R)] MICROALBUMIN, UR, RAND W/ MICROALBUMIN/CREA RATIO [id number] CPT(R)] Follow-up Instructions Return in about 3 months (around 2/8/2018). To-Do List   
 Around 01/30/2018 Lab:  HEMOGLOBIN A1C WITH EAG Around 01/30/2018 Lab:  LIPID PANEL Around 01/30/2018 Lab:  METABOLIC PANEL, COMPREHENSIVE Around 01/30/2018 Lab:  VITAMIN D, 25 HYDROXY Patient Instructions Stop the Januvia. Start Glyburide in the morning if fasting blood sugars remain >150. Introducing \[facility]\"" & HEALTH SERVICES! Judah Serrato introduces [employer] patient portal. Now you can access parts of your medical record, email your doctor's office, and request medication refills online. 1. In your internet browser, go to [email or web address] 2. Click on the First Time User? Click Here link in the Sign In box. You will see the New Member Sign Up page. 3. Enter your [employer] Access Code exactly as it appears below. You will not need to use this code after youve completed the sign-up process. If you do not sign up before the expiration date, you must request a new code. · [employer] Access Code: [email or web address] Expires: 2/6/2018 12:05 PM 
 
4. Enter the last four digits of your Social Security Number (xxxx) and Date of Birth (mm/dd/yyyy) as indicated and click Submit. You will be taken to the next sign-up page. 5. Create a Voyatt ID. This will be your Voyatt login ID and cannot be changed, so think of one that is secure and easy to remember.  
6. Create a [employer]tel password. You can change your password at any time. 7. Enter your Password Reset Question and Answer. This can be used at a later time if you forget your password. 8. Enter your e-mail address. You will receive e-mail notification when new information is available in [address] 9. Click Sign Up. You can now view and download portions of your medical record. 10. Click the Download Summary menu link to download a portable copy of your medical information. If you have questions, please visit the Frequently Asked Questions section of the [employer] website. Remember, [employer] is NOT to be used for urgent needs. For medical emergencies, dial 911. Now available from your iPhone and Android! Please provide this summary of care documentation to your next provider. Your primary care clinician is listed as Greg Gerardo. If you have any questions after today's visit, please call [phone].

## 2017-11-10 LAB
ALBUMIN/CREAT UR: 3.5 MG/G CREAT (ref 0–30)
BUN SERPL-MCNC: 21 MG/DL (ref 8–27)
BUN/CREAT SERPL: 16 (ref 10–24)
CALCIUM SERPL-MCNC: 9.8 MG/DL (ref 8.6–10.2)
CHLORIDE SERPL-SCNC: 99 MMOL/L (ref 96–106)
CO2 SERPL-SCNC: 24 MMOL/L (ref 18–29)
CREAT SERPL-MCNC: 1.31 MG/DL (ref 0.76–1.27)
CREAT UR-MCNC: 99.7 MG/DL
EST. AVERAGE GLUCOSE BLD GHB EST-MCNC: 134 MG/DL
GFR SERPLBLD CREATININE-BSD FMLA CKD-EPI: 54 ML/MIN/1.73
GFR SERPLBLD CREATININE-BSD FMLA CKD-EPI: 62 ML/MIN/1.73
GLUCOSE SERPL-MCNC: 152 MG/DL (ref 65–99)
HBA1C MFR BLD: 6.3 % (ref 4.8–5.6)
INTERPRETATION: NORMAL
Lab: NORMAL
MICROALBUMIN UR-MCNC: 3.5 UG/ML
POTASSIUM SERPL-SCNC: 4.7 MMOL/L (ref 3.5–5.2)
SODIUM SERPL-SCNC: 139 MMOL/L (ref 134–144)

## 2018-01-08 ENCOUNTER — HOSPITAL ENCOUNTER (OUTPATIENT)
Dept: LAB | Age: 73
Discharge: HOME | End: 2018-01-08
Payer: MEDICARE

## 2018-01-08 PROCEDURE — 80053 COMPREHEN METABOLIC PANEL: CPT

## 2018-01-08 PROCEDURE — 82306 VITAMIN D 25 HYDROXY: CPT

## 2018-01-08 PROCEDURE — 80061 LIPID PANEL: CPT

## 2018-01-08 PROCEDURE — 36415 COLL VENOUS BLD VENIPUNCTURE: CPT

## 2018-01-08 PROCEDURE — 83036 HEMOGLOBIN GLYCOSYLATED A1C: CPT

## 2018-01-09 LAB
25(OH)D3+25(OH)D2 SERPL-MCNC: 30.3 NG/ML (ref 30–100)
ALBUMIN SERPL-MCNC: 3.9 G/DL (ref 3.5–4.8)
ALBUMIN/GLOB SERPL: 1.4 {RATIO} (ref 1.2–2.2)
ALP SERPL-CCNC: 75 IU/L (ref 39–117)
ALT SERPL-CCNC: 12 IU/L (ref 0–44)
AST SERPL-CCNC: 17 IU/L (ref 0–40)
BILIRUB SERPL-MCNC: 0.4 MG/DL (ref 0–1.2)
BUN SERPL-MCNC: 22 MG/DL (ref 8–27)
BUN/CREAT SERPL: 20 (ref 10–24)
CALCIUM SERPL-MCNC: 9.1 MG/DL (ref 8.6–10.2)
CHLORIDE SERPL-SCNC: 102 MMOL/L (ref 96–106)
CHOLEST SERPL-MCNC: 125 MG/DL (ref 100–199)
CO2 SERPL-SCNC: 24 MMOL/L (ref 18–29)
CREAT SERPL-MCNC: 1.1 MG/DL (ref 0.76–1.27)
EST. AVERAGE GLUCOSE BLD GHB EST-MCNC: 143 MG/DL
GLOBULIN SER CALC-MCNC: 2.7 G/DL (ref 1.5–4.5)
GLUCOSE SERPL-MCNC: 145 MG/DL (ref 65–99)
HBA1C MFR BLD: 6.6 % (ref 4.8–5.6)
HDLC SERPL-MCNC: 34 MG/DL
INTERPRETATION, 910389: NORMAL
LDLC SERPL CALC-MCNC: 64 MG/DL (ref 0–99)
Lab: NORMAL
POTASSIUM SERPL-SCNC: 4.4 MMOL/L (ref 3.5–5.2)
PROT SERPL-MCNC: 6.6 G/DL (ref 6–8.5)
SODIUM SERPL-SCNC: 141 MMOL/L (ref 134–144)
TRIGL SERPL-MCNC: 137 MG/DL (ref 0–149)
VLDLC SERPL CALC-MCNC: 27 MG/DL (ref 5–40)

## 2018-01-11 ENCOUNTER — OFFICE VISIT (OUTPATIENT)
Dept: INTERNAL MEDICINE CLINIC | Age: 73
End: 2018-01-11

## 2018-01-11 ENCOUNTER — DOCUMENTATION ONLY (OUTPATIENT)
Dept: INTERNAL MEDICINE CLINIC | Age: 73
End: 2018-01-11

## 2018-01-11 VITALS
WEIGHT: 202 LBS | DIASTOLIC BLOOD PRESSURE: 80 MMHG | OXYGEN SATURATION: 94 % | TEMPERATURE: 98.3 F | HEART RATE: 64 BPM | SYSTOLIC BLOOD PRESSURE: 130 MMHG | BODY MASS INDEX: 29.92 KG/M2 | RESPIRATION RATE: 16 BRPM | HEIGHT: 69 IN

## 2018-01-11 DIAGNOSIS — G89.29 CHRONIC PAIN OF LEFT KNEE: ICD-10-CM

## 2018-01-11 DIAGNOSIS — E11.8 TYPE 2 DIABETES MELLITUS WITH COMPLICATION, WITHOUT LONG-TERM CURRENT USE OF INSULIN (HCC): Primary | Chronic | ICD-10-CM

## 2018-01-11 DIAGNOSIS — M25.562 CHRONIC PAIN OF LEFT KNEE: ICD-10-CM

## 2018-01-11 DIAGNOSIS — I10 ESSENTIAL HYPERTENSION: Chronic | ICD-10-CM

## 2018-01-11 RX ORDER — TRAMADOL HYDROCHLORIDE 50 MG/1
50 TABLET ORAL
Qty: 180 TAB | Refills: 0 | Status: SHIPPED | OUTPATIENT
Start: 2018-02-01 | End: 2018-05-01 | Stop reason: SDUPTHER

## 2018-01-11 NOTE — MR AVS SNAPSHOT
Visit Information Date & Time Provider Department Dept. Phone Encounter #  
 1/11/2018  3:30 PM Dru Bay MD [facility] [address] [id number] Follow-up Instructions Return in about 6 months (around 7/11/2018) for Medicare Wellness. Upcoming Health Maintenance Date Due DTaP/Tdap/Td series (1 - Tdap) 12/18/1965 ZOSTER VACCINE AGE 60> 10/18/2004 Pneumococcal 65+ Low/Medium Risk (1 of 2 - PCV13) 12/18/2009 EYE EXAM RETINAL OR DILATED Q1 6/30/2018 MEDICARE YEARLY EXAM 7/7/2018 HEMOGLOBIN A1C Q6M 7/8/2018 MICROALBUMIN Q1 11/8/2018 LIPID PANEL Q1 1/8/2019 FOOT EXAM Q1 1/11/2019 GLAUCOMA SCREENING Q2Y 6/30/2019 COLONOSCOPY 8/11/2020 Allergies as of 1/11/2018  Review Complete On: 8/10/8112 By: Paul Slider Wears No Known Allergies Current Immunizations  Never Reviewed Name Date Influenza High Dose Vaccine PF 10/18/2016 10:48 AM  
  
 Not reviewed this visit You Were Diagnosed With   
  
 Codes Comments Type 2 diabetes mellitus with complication, without long-term current use of insulin (HCC)    -  Primary ICD-10-CM: E11.8 ICD-9-CM: 250.90 Essential hypertension     ICD-10-CM: I10 
ICD-9-CM: 401.9 Chronic pain of left knee     ICD-10-CM: M25.562, G89.29 ICD-9-CM: 719.46, 338.29 Vitals BP Pulse Temp Resp Height(growth percentile) Weight(growth percentile) 130/80 64 98.3 °F (36.8 °C) (Oral) 16 5' 9\" (1.753 m) 202 lb (91.6 kg) SpO2 BMI Smoking Status 94% 29.83 kg/m2 Never Smoker Vitals History BMI and BSA Data Body Mass Index Body Surface Area  
 29.83 kg/m 2 2.11 m 2 Preferred Pharmacy Pharmacy Name Phone [facility] [address] [facility] [phone] Your Updated Medication List  
  
   
This list is accurate as of: 1/11/18  3:50 PM.  Always use your most recent med list.  
  
  
  
  
 BD Single Use Swabs Regular Padm Generic drug:  alcohol swabs CHECK BLOOD SUGAR DAILY CALCIUM 500 PO Take 1 Tab by mouth two (2) times a day. CENTRUM SILVER PO Take 1 Tab by mouth daily. ergocalciferol 50,000 unit capsule Commonly known as:  VITAMIN D2 Take 1 Cap by mouth every thirty (30) days. Takes 1st of month  
  
 glimepiride 1 mg tablet Commonly known as:  AMARYL  
TAKE 1 TABLET BY MOUTH DAILY BEFORE BREAKFAST  
  
 IRON PO Take 325 mg by mouth daily (after dinner). JANUVIA 100 mg tablet Generic drug:  SITagliptin TAKE 1 TABLET BY MOUTH EVERY MORNING  
  
 loratadine 10 mg tablet Commonly known as:  CLARITIN  
TK 1 T PO QD FOR ALLERGY SYPMTOMS  
  
 losartan 100 mg tablet Commonly known as:  COZAAR Take 1 Tab by mouth daily. metFORMIN 1,000 mg tablet Commonly known as:  GLUCOPHAGE  
TAKE 1 TABLET BY MOUTH TWICE DAILY WITH MEALS  
  
 omeprazole 20 mg capsule Commonly known as:  PRILOSEC TK 1 C PO QD  
  
 ONETOUCH DELICA LANCETS 30 gauge Misc Generic drug:  lancets CHECK BLOOD SUGAR DAILY  
  
 ONETOUCH ULTRA TEST strip Generic drug:  glucose blood VI test strips CHECK BLOOD SUGAR DAILY  
  
 traMADol 50 mg tablet Commonly known as:  ULTRAM  
Take 1 Tab by mouth two (2) times daily as needed for Pain. Max Daily Amount: 100 mg. Start taking on:  2/1/2018 * triamterene-hydroCHLOROthiazide 37.5-25 mg per capsule Commonly known as:  Lenton Bumps Take 1 Cap by mouth daily. * triamterene-hydroCHLOROthiazide 37.5-25 mg per tablet Commonly known as:  Jameson Cutter TK 1 T PO QD  
  
 * Notice: This list has 2 medication(s) that are the same as other medications prescribed for you. Read the directions carefully, and ask your doctor or other care provider to review them with you. Prescriptions Printed Refills  
 traMADol (ULTRAM) 50 mg tablet 0 Starting on: 2/1/2018  Sig: Take 1 Tab by mouth two (2) times daily as needed for Pain. Max Daily Amount: 100 mg. Class: Print Route: Oral  
  
Follow-up Instructions Return in about 6 months (around 7/11/2018) for Medicare Wellness. Introducing [facility] & HEALTH SERVICES! Sacha Moore introduces [employer] patient portal. Now you can access parts of your medical record, email your doctor's office, and request medication refills online. 1. In your internet browser, go to [email or web address] 2. Click on the First Time User? Click Here link in the Sign In box. You will see the New Member Sign Up page. 3. Enter your [employer] Access Code exactly as it appears below. You will not need to use this code after youve completed the sign-up process. If you do not sign up before the expiration date, you must request a new code. · [employer] Access Code: [email or web address] Expires: 2/6/2018 12:05 PM 
 
4. Enter the last four digits of your Social Security Number (xxxx) and Date of Birth (mm/dd/yyyy) as indicated and click Submit. You will be taken to the next sign-up page. 5. Create a [employer] ID. This will be your [employer] login ID and cannot be changed, so think of one that is secure and easy to remember. 6. Create a [employer] password. You can change your password at any time. 7. Enter your Password Reset Question and Answer. This can be used at a later time if you forget your password. 8. Enter your e-mail address. You will receive e-mail notification when new information is available in [address] 9. Click Sign Up. You can now view and download portions of your medical record. 10. Click the Download Summary menu link to download a portable copy of your medical information. If you have questions, please visit the Frequently Asked Questions section of the [employer] website. Remember, [employer] is NOT to be used for urgent needs. For medical emergencies, dial 911. Now available from your iPhone and Android!  
 
  
  
 Please provide this summary of care documentation to your next provider. Your primary care clinician is listed as Miriam Parker. If you have any questions after today's visit, please call [phone].

## 2018-01-11 NOTE — PROGRESS NOTES
Asher Santillan is a 67 y.o. male who presents today for Diabetes and Hypertension  . He has a history of   Patient Active Problem List   Diagnosis Code    Rotator cuff tear M75.100    Biceps rupture, proximal S46.119A    Rotator cuff tear arthropathy of right shoulder M12.811    Primary localized osteoarthrosis, lower leg M17.10    Hypertension I10    DM type 2 (diabetes mellitus, type 2) ([facility] [address] E11.9    Renal insufficiency N28.9    GERD (gastroesophageal reflux disease) K21.9    Vitamin D deficiency E55.9   . Today patient is here for f/u.   he does have other concerns. R Shoulder Pain. This has improved. Still with other MS pain (mainly knee). Taking BID tramadol. No issues with this. No concern over abuse. No NSAIDs given Cr. In the past. No improvement with acetaminophen. Diabetes Mellitus follow-up - Stable and stopped [place] and [place]. Has had lows with glymiperide. Last hemoglobin a1c   Lab Results   Component Value Date/Time    Hemoglobin A1c 6.6 01/08/2018 08:40 AM     No components found for: POCA1C, HBA1C POC  medication compliance: compliant all of the time. Diabetic diet compliance: compliant most of the time. Home glucose monitoring: fasting values range 119-160. Hypoglycemic episodes:  With amaryl. Further diabetic ROS: no polyuria or polydipsia, no chest pain, dyspnea or TIA's, no numbness, tingling or pain in extremities. Diabetes Complications: none  Last Eye Exam: UTD  Last Foot Exam: UTD  Microalbuminuria:   Lab Results   Component Value Date/Time    Microalb/Creat ratio (ug/mg creat.) 3.5 11/08/2017 12:48 PM     Hypertension - a bit up. On Maxide and ARB. A bit upset right now due to worker's comp case. Hypertension ROS: taking medications as instructed, no medication side effects noted, no TIA's, no chest pain on exertion, no dyspnea on exertion, no swelling of ankles     reports that he has never smoked.  He has never used smokeless tobacco.    reports that he drinks alcohol. BP Readings from Last 2 Encounters:   01/11/18 130/80   11/08/17 130/80         ROS  Review of Systems   Constitutional: Negative for chills, fever and weight loss. Eyes: Negative for blurred vision and double vision. Respiratory: Negative for cough and shortness of breath. Cardiovascular: Negative for chest pain, palpitations and leg swelling. Gastrointestinal: Negative for abdominal pain, nausea and vomiting. Musculoskeletal: Positive for back pain and joint pain. Neurological: Negative. Endo/Heme/Allergies: Does not bruise/bleed easily. Psychiatric/Behavioral: Negative for depression. The patient is not nervous/anxious. Visit Vitals    /80    Pulse 64    Temp 98.3 °F (36.8 °C) (Oral)    Resp 16    Ht 5' 9\" (1.753 m)    Wt 202 lb (91.6 kg)    SpO2 94%    BMI 29.83 kg/m2       Physical Exam   Constitutional: He is oriented to person, place, and time. He appears well-developed and well-nourished. HENT:   Head: Normocephalic and atraumatic. Eyes: EOM are normal. Pupils are equal, round, and reactive to light. Cardiovascular: Normal rate and regular rhythm. No murmur heard. Pulmonary/Chest: Effort normal and breath sounds normal. No respiratory distress. Musculoskeletal: He exhibits no edema. Limited ROM of R shoulder. Neurological: He is alert and oriented to person, place, and time. Skin: Skin is warm and dry. He is not diaphoretic. Foot exam  - skin intact, mild dryness w no fissures, no rashes, no significant ulcers or callous formation. Sensation intact by microfilament or light touch     Psychiatric: He has a normal mood and affect. His behavior is normal.         Current Outpatient Prescriptions   Medication Sig    [START ON 2/1/2018] traMADol (ULTRAM) 50 mg tablet Take 1 Tab by mouth two (2) times daily as needed for Pain.  Max Daily Amount: 100 mg.    ONETOUCH ULTRA TEST strip CHECK BLOOD SUGAR DAILY    glimepiride (AMARYL) 1 mg tablet TAKE 1 TABLET BY MOUTH DAILY BEFORE BREAKFAST    losartan (COZAAR) 100 mg tablet Take 1 Tab by mouth daily.  ergocalciferol (VITAMIN D2) 50,000 unit capsule Take 1 Cap by mouth every thirty (30) days. Takes 1st of month    omeprazole (PRILOSEC) 20 mg capsule TK 1 C PO QD    triamterene-hydroCHLOROthiazide (MAXZIDE) 37.5-25 mg per tablet TK 1 T PO QD    metFORMIN (GLUCOPHAGE) 1,000 mg tablet TAKE 1 TABLET BY MOUTH TWICE DAILY WITH MEALS    ONETOUCH DELICA LANCETS 30 gauge misc CHECK BLOOD SUGAR DAILY    BD SINGLE USE SWABS REGULAR padm CHECK BLOOD SUGAR DAILY    CALCIUM CARBONATE (CALCIUM 500 PO) Take 1 Tab by mouth two (2) times a day.  FERROUS FUMARATE (IRON PO) Take 325 mg by mouth daily (after dinner).  FOLIC ACID/MULTIVIT-MIN/LUTEIN (CENTRUM SILVER PO) Take 1 Tab by mouth daily.  JANUVIA 100 mg tablet TAKE 1 TABLET BY MOUTH EVERY MORNING    loratadine (CLARITIN) 10 mg tablet TK 1 T PO QD FOR ALLERGY SYPMTOMS    triamterene-hydrochlorothiazide (DYAZIDE) 37.5-25 mg per capsule Take 1 Cap by mouth daily. No current facility-administered medications for this visit.          Past Medical History:   Diagnosis Date    Arthritis     Diabetes ([address]     GERD (gastroesophageal reflux disease)     Hypertension     Nausea & vomiting       Past Surgical History:   Procedure Laterality Date    HX CATARACT REMOVAL Bilateral 9/2012    HX KNEE ARTHROSCOPY Left     HX MOHS PROCEDURES Right 3/2015    and bicep tear repair    HX ORTHOPAEDIC Left 1/2012    total knee replacement      Social History   Substance Use Topics    Smoking status: Never Smoker    Smokeless tobacco: Never Used    Alcohol use Yes      Comment: less than 1 beer/month      Family History   Problem Relation Age of Onset    Heart Disease Father     Stroke Father     Hypertension Father     Cancer Brother      lung    Cancer Brother      lung        No Known Allergies     Assessment/Plan  Diagnoses and all orders for this visit:    1. Type 2 diabetes mellitus with complication, without long-term current use of insulin ([address] - Stay off Januvia given cost. Pt to take daily amaryl. Let us know if he has any lows. 2. Essential hypertension - much better on repeat. 3. Chronic pain of left knee - avoid NSAIDs given Cr.   -     traMADol (ULTRAM) 50 mg tablet; Take 1 Tab by mouth two (2) times daily as needed for Pain. Max Daily Amount: 100 mg. Follow-up Disposition:  Return in about 6 months (around 7/11/2018) for Medicare Wellness.     Kandice Chavez MD  1/11/2018

## 2018-01-11 NOTE — LETTER
1/11/2018 3:56 PM 
 
Mr. Rubén Brooks 
[address] [facility] [address] [id number] [facility] [address] To whom it may concern/Mr. Daren Anguiano, We are avoiding prescribing NSAIDs to Mr. Ramesh Brito given his history of CKD. Pain management of his Osteoarthritis has been ineffective with acetaminophen. He has been on BID tramadol for some time. The majority of his pain is due to his knees. He is currently not taking any medications for his shoulder. At this time I do not anticipate any changes in medications for pain management, though the future is unpredictable. Sincerely, Domenica Saleem MD

## 2018-01-30 DIAGNOSIS — E78.5 HYPERLIPIDEMIA, UNSPECIFIED HYPERLIPIDEMIA TYPE: ICD-10-CM

## 2018-01-30 DIAGNOSIS — E55.9 VITAMIN D DEFICIENCY: ICD-10-CM

## 2018-01-30 DIAGNOSIS — I10 ESSENTIAL HYPERTENSION: Chronic | ICD-10-CM

## 2018-01-30 DIAGNOSIS — E11.8 TYPE 2 DIABETES MELLITUS WITH COMPLICATION, WITHOUT LONG-TERM CURRENT USE OF INSULIN (HCC): Chronic | ICD-10-CM

## 2018-02-28 DIAGNOSIS — I10 ESSENTIAL HYPERTENSION: Chronic | ICD-10-CM

## 2018-02-28 RX ORDER — LOSARTAN POTASSIUM 100 MG/1
TABLET ORAL
Qty: 90 TAB | Refills: 0 | Status: SHIPPED | OUTPATIENT
Start: 2018-02-28 | End: 2018-05-24 | Stop reason: SDUPTHER

## 2018-04-18 DIAGNOSIS — E11.8 TYPE 2 DIABETES MELLITUS WITH COMPLICATION, WITHOUT LONG-TERM CURRENT USE OF INSULIN (HCC): Chronic | ICD-10-CM

## 2018-04-19 RX ORDER — METFORMIN HYDROCHLORIDE 1000 MG/1
TABLET ORAL
Qty: 180 TAB | Refills: 0 | Status: SHIPPED | OUTPATIENT
Start: 2018-04-19 | End: 2018-07-18 | Stop reason: SDUPTHER

## 2018-05-17 ENCOUNTER — OFFICE VISIT (OUTPATIENT)
Dept: INTERNAL MEDICINE CLINIC | Age: 73
End: 2018-05-17

## 2018-05-17 ENCOUNTER — HOSPITAL ENCOUNTER (OUTPATIENT)
Dept: LAB | Age: 73
Discharge: HOME | End: 2018-05-17
Payer: MEDICARE

## 2018-05-17 VITALS
OXYGEN SATURATION: 93 % | BODY MASS INDEX: 30.51 KG/M2 | SYSTOLIC BLOOD PRESSURE: 135 MMHG | DIASTOLIC BLOOD PRESSURE: 75 MMHG | HEART RATE: 68 BPM | WEIGHT: 206 LBS | TEMPERATURE: 97.8 F | RESPIRATION RATE: 16 BRPM | HEIGHT: 69 IN

## 2018-05-17 DIAGNOSIS — M17.10 PRIMARY LOCALIZED OSTEOARTHROSIS OF LOWER LEG, UNSPECIFIED LATERALITY: ICD-10-CM

## 2018-05-17 DIAGNOSIS — M25.552 PAIN OF BOTH HIP JOINTS: ICD-10-CM

## 2018-05-17 DIAGNOSIS — E11.8 TYPE 2 DIABETES MELLITUS WITH COMPLICATION, WITHOUT LONG-TERM CURRENT USE OF INSULIN (HCC): Chronic | ICD-10-CM

## 2018-05-17 DIAGNOSIS — I10 ESSENTIAL HYPERTENSION: Chronic | ICD-10-CM

## 2018-05-17 DIAGNOSIS — Z12.5 SCREENING FOR PROSTATE CANCER: ICD-10-CM

## 2018-05-17 DIAGNOSIS — F43.29 STRESS AND ADJUSTMENT REACTION: Primary | ICD-10-CM

## 2018-05-17 DIAGNOSIS — M25.551 PAIN OF BOTH HIP JOINTS: ICD-10-CM

## 2018-05-17 PROCEDURE — 36415 COLL VENOUS BLD VENIPUNCTURE: CPT

## 2018-05-17 PROCEDURE — 80048 BASIC METABOLIC PNL TOTAL CA: CPT

## 2018-05-17 PROCEDURE — 84153 ASSAY OF PSA TOTAL: CPT

## 2018-05-17 PROCEDURE — 83036 HEMOGLOBIN GLYCOSYLATED A1C: CPT

## 2018-05-17 NOTE — MR AVS SNAPSHOT
[facility] 
 
 
 [address] 
[phone] Patient: Silvia Larsen MRN: [id number] :1945 Visit Information Date & Time Provider Department Dept. Phone Encounter #  
 2018  9:00 AM Ana Maria Villatoro MD [facility] [phone] [id number] Follow-up Instructions Return in about 3 months (around 2018). Upcoming Health Maintenance Date Due DTaP/Tdap/Td series (1 - Tdap) 1965 ZOSTER VACCINE AGE 60> 10/18/2004 Pneumococcal 65+ Low/Medium Risk (1 of 2 - PCV13) 2009 EYE EXAM RETINAL OR DILATED Q1 2018 MEDICARE YEARLY EXAM 2018 HEMOGLOBIN A1C Q6M 2018 Influenza Age 5 to Adult 2018 MICROALBUMIN Q1 2018 LIPID PANEL Q1 2019 FOOT EXAM Q1 2019 GLAUCOMA SCREENING Q2Y 2019 COLONOSCOPY 2020 Allergies as of 2018  Review Complete On: 2018 By: Ana Maria Villatoro MD  
 No Known Allergies Current Immunizations  Never Reviewed Name Date Influenza High Dose Vaccine PF 10/18/2016 10:48 AM  
  
 Not reviewed this visit You Were Diagnosed With   
  
 Codes Comments Stress and adjustment reaction    -  Primary ICD-10-CM: B46.80 ICD-9-CM: 309.89 Type 2 diabetes mellitus with complication, without long-term current use of insulin (HCC)     ICD-10-CM: E11.8 ICD-9-CM: 250.90 Essential hypertension     ICD-10-CM: I10 
ICD-9-CM: 401.9 Primary localized osteoarthrosis of lower leg, unspecified laterality     ICD-10-CM: M17.10 ICD-9-CM: 715.16 Pain of both hip joints     ICD-10-CM: M25.551, M25.552 ICD-9-CM: 719.45 Screening for prostate cancer     ICD-10-CM: Z12.5 ICD-9-CM: V76.44 Vitals BP Pulse Temp Resp Height(growth percentile) Weight(growth percentile)  162/81 (BP 1 Location: Left arm, BP Patient Position: Sitting) 68 97.8 °F (36.6 °C) (Oral) 16 5' 9\" (1.753 m) 206 lb (93.4 kg) SpO2 BMI Smoking Status 93% 30.42 kg/m2 Never Smoker Vitals History BMI and BSA Data Body Mass Index Body Surface Area  
 30.42 kg/m 2 2.13 m 2 Preferred Pharmacy Pharmacy Name Phone [facility] [address] [facility]  [facility] [phone] Your Updated Medication List  
  
   
This list is accurate as of 5/17/18  9:33 AM.  Always use your most recent med list.  
  
  
  
  
 BD Single Use Swabs Regular Padm Generic drug:  alcohol swabs CHECK BLOOD SUGAR DAILY CALCIUM 500 PO Take 1 Tab by mouth two (2) times a day. CENTRUM SILVER PO Take 1 Tab by mouth daily. ergocalciferol 50,000 unit capsule Commonly known as:  VITAMIN D2 Take 1 Cap by mouth every thirty (30) days. Takes 1st of month * glimepiride 1 mg tablet Commonly known as:  AMARYL  
TAKE 1 TABLET BY MOUTH DAILY BEFORE BREAKFAST * glimepiride 1 mg tablet Commonly known as:  AMARYL  
TAKE 1 TABLET BY MOUTH DAILY BEFORE BREAKFAST  
  
 loratadine 10 mg tablet Commonly known as:  CLARITIN  
TK 1 T PO QD FOR ALLERGY SYPMTOMS  
  
 losartan 100 mg tablet Commonly known as:  COZAAR  
TAKE 1 TABLET BY MOUTH DAILY  
  
 metFORMIN 1,000 mg tablet Commonly known as:  GLUCOPHAGE  
TAKE 1 TABLET BY MOUTH TWICE DAILY WITH MEALS  
  
 omeprazole 20 mg capsule Commonly known as:  PRILOSEC TK 1 C PO QD  
  
 ONETOUCH DELICA LANCETS 30 gauge Misc Generic drug:  lancets CHECK BLOOD SUGAR DAILY  
  
 ONETOUCH ULTRA TEST strip Generic drug:  glucose blood VI test strips CHECK BLOOD SUGAR DAILY  
  
 traMADol 50 mg tablet Commonly known as:  ULTRAM  
TAKE 1 TABLET BY MOUTH TWICE DAILY AS NEEDED FOR PAIN  
  
 triamterene-hydroCHLOROthiazide 37.5-25 mg per tablet Commonly known as:  [place]anda [place] TK 1 T PO QD  
  
 * Notice:   This list has 2 medication(s) that are the same as other medications prescribed for you. Read the directions carefully, and ask your doctor or other care provider to review them with you. We Performed the Following HEMOGLOBIN A1C WITH EAG [99502 CPT(R)] METABOLIC PANEL, BASIC [08223 CPT(R)] PSA SCREENING [ [facility]] REFERRAL TO ORTHOPEDIC SURGERY [REF62 Custom] Follow-up Instructions Return in about 3 months (around 8/17/2018). Referral Information Referral ID Referred By Referred To  
  
 [id number] Basia Coles [address] Status Start Date End Date 1 New Request 5/17/18 5/17/19 If your referral has a status of pending review or denied, additional information will be sent to support the outcome of this decision. Introducing [facility]! Dwight Bob introduces [employer] patient portal. Now you can access parts of your medical record, email your doctor's office, and request medication refills online. 1. In your internet browser, go to [email or web address] 2. Click on the First Time User? Click Here link in the Sign In box. You will see the New Member Sign Up page. 3. Enter your [employer] Access Code exactly as it appears below. You will not need to use this code after youve completed the sign-up process. If you do not sign up before the expiration date, you must request a new code. · [employer] Access Code: [email or web address] Expires: 8/15/2018  9:33 AM 
 
4. Enter the last four digits of your Social Security Number (xxxx) and Date of Birth (mm/dd/yyyy) as indicated and click Submit. You will be taken to the next sign-up page. 5. Create a [employer] ID. This will be your [employer] login ID and cannot be changed, so think of one that is secure and easy to remember. 6. Create a [employer] password. You can change your password at any time. 7. Enter your Password Reset Question and Answer.  This can be used at a later time if you forget your password. 8. Enter your e-mail address. You will receive e-mail notification when new information is available in [address] 9. Click Sign Up. You can now view and download portions of your medical record. 10. Click the Download Summary menu link to download a portable copy of your medical information. If you have questions, please visit the Frequently Asked Questions section of the [employer] website. Remember, [employer] is NOT to be used for urgent needs. For medical emergencies, dial 911. Now available from your iPhone and Android! Please provide this summary of care documentation to your next provider. Your primary care clinician is listed as Cortez Vega. If you have any questions after today's visit, please call [phone].

## 2018-05-17 NOTE — PROGRESS NOTES
Tanmay Ludwig is a 67 y.o. male who presents today for Diabetes  . He has a history of   Patient Active Problem List   Diagnosis Code    Rotator cuff tear M75.100    Biceps rupture, proximal S46.119A    Rotator cuff tear arthropathy of right shoulder M12.811    Primary localized osteoarthrosis, lower leg M17.10    Hypertension I10    DM type 2 (diabetes mellitus, type 2) ([facility] [address] E11.9    Renal insufficiency N28.9    GERD (gastroesophageal reflux disease) K21.9    Vitamin D deficiency E55.9   . Today patient is here for f/u. Notes that he is under a lot of stress over his workman's comp. Still disabled due to shoulder problems. Financial stress is still high. Weight trending up. Still taking BID tramadol for orthopedic issues. Not taking any NSAIDS. Having worsening pain to hips, especially with exertion. This is limiting his activity. Not having any SI/HI. Temper seems to be a bit worse. Hypertension - On ARB. Repeat is better. Hypertension ROS: taking medications as instructed, no medication side effects noted, no TIA's, no chest pain on exertion, no dyspnea on exertion, no swelling of ankles     reports that he has never smoked. He has never used smokeless tobacco.    reports that he drinks alcohol. BP Readings from Last 2 Encounters:   05/17/18 135/75   01/11/18 130/80     Diabetes Mellitus follow-up - off januvia. Taking low dose amaryl. Only one episode of lows. Last hemoglobin a1c   Lab Results   Component Value Date/Time    Hemoglobin A1c 6.6 (H) 01/08/2018 08:40 AM     No components found for: POCA1C, HBA1C POC  medication compliance: compliant all of the time. Diabetic diet compliance: compliant most of the time. Home glucose monitoring: fasting values range 115-135. Hypoglycemic episodes:  none. Further diabetic ROS: no polyuria or polydipsia, no chest pain, dyspnea or TIA's, no numbness, tingling or pain in extremities.    Last Eye Exam: UTD  Last Foot Exam: UTD  Microalbuminuria:   Lab Results   Component Value Date/Time    Microalb/Creat ratio (ug/mg creat.) 3.5 11/08/2017 12:48 PM       ROS  Review of Systems   Constitutional: Negative for chills, fever and weight loss. Respiratory: Negative for cough and shortness of breath. Cardiovascular: Negative for chest pain, palpitations and leg swelling. Gastrointestinal: Negative for abdominal pain, diarrhea, heartburn, nausea and vomiting. Genitourinary: Negative for dysuria, frequency and urgency. Musculoskeletal: Positive for back pain and joint pain. Negative for falls, myalgias and neck pain. Neurological: Negative. Endo/Heme/Allergies: Does not bruise/bleed easily. Psychiatric/Behavioral: Negative for depression (low mood), hallucinations, substance abuse and suicidal ideas. The patient is not nervous/anxious. Visit Vitals    /75    Pulse 68    Temp 97.8 °F (36.6 °C) (Oral)    Resp 16    Ht 5' 9\" (1.753 m)    Wt 206 lb (93.4 kg)    SpO2 93%    BMI 30.42 kg/m2       Physical Exam   Constitutional: He is oriented to person, place, and time. He appears well-developed and well-nourished. HENT:   Head: Normocephalic and atraumatic. Eyes: EOM are normal. Pupils are equal, round, and reactive to light. Cardiovascular: Normal rate and regular rhythm. No murmur heard. Pulmonary/Chest: Effort normal and breath sounds normal. No respiratory distress. Abdominal: Soft. Bowel sounds are normal. He exhibits no distension. There is no tenderness. Musculoskeletal: Normal range of motion. He exhibits no edema. Neurological: He is alert and oriented to person, place, and time. Skin: Skin is warm and dry. He is not diaphoretic. Psychiatric: He has a normal mood and affect.  His behavior is normal.         Current Outpatient Prescriptions   Medication Sig    traMADol (ULTRAM) 50 mg tablet TAKE 1 TABLET BY MOUTH TWICE DAILY AS NEEDED FOR PAIN    metFORMIN (GLUCOPHAGE) 1,000 mg tablet TAKE 1 TABLET BY MOUTH TWICE DAILY WITH MEALS    glimepiride (AMARYL) 1 mg tablet TAKE 1 TABLET BY MOUTH DAILY BEFORE BREAKFAST    losartan (COZAAR) 100 mg tablet TAKE 1 TABLET BY MOUTH DAILY    ONETOUCH ULTRA TEST strip CHECK BLOOD SUGAR DAILY    glimepiride (AMARYL) 1 mg tablet TAKE 1 TABLET BY MOUTH DAILY BEFORE BREAKFAST    ergocalciferol (VITAMIN D2) 50,000 unit capsule Take 1 Cap by mouth every thirty (30) days. Takes 1st of month    omeprazole (PRILOSEC) 20 mg capsule TK 1 C PO QD    triamterene-hydroCHLOROthiazide (MAXZIDE) 37.5-25 mg per tablet TK 1 T PO QD    ONETOUCH DELICA LANCETS 30 gauge misc CHECK BLOOD SUGAR DAILY    BD SINGLE USE SWABS REGULAR padm CHECK BLOOD SUGAR DAILY    CALCIUM CARBONATE (CALCIUM 500 PO) Take 1 Tab by mouth two (2) times a day.  FOLIC ACID/MULTIVIT-MIN/LUTEIN (CENTRUM SILVER PO) Take 1 Tab by mouth daily.  loratadine (CLARITIN) 10 mg tablet TK 1 T PO QD FOR ALLERGY SYPMTOMS     No current facility-administered medications for this visit. Past Medical History:   Diagnosis Date    Arthritis     Diabetes ([address]     GERD (gastroesophageal reflux disease)     Hypertension     Nausea & vomiting       Past Surgical History:   Procedure Laterality Date    HX CATARACT REMOVAL Bilateral 9/2012    HX KNEE ARTHROSCOPY Left     HX MOHS PROCEDURES Right 3/2015    and bicep tear repair    HX ORTHOPAEDIC Left 1/2012    total knee replacement      Social History   Substance Use Topics    Smoking status: Never Smoker    Smokeless tobacco: Never Used    Alcohol use Yes      Comment: less than 1 beer/month      Family History   Problem Relation Age of Onset    Heart Disease Father     Stroke Father     Hypertension Father     Cancer Brother      lung    Cancer Brother      lung        No Known Allergies     Assessment/Plan  Diagnoses and all orders for this visit:    1. Stress and adjustment reaction - mainly coming from workman's comp.  No red flags. Try to increase physical activity. Try swimming. Consider therapist if this continues. 2. Type 2 diabetes mellitus with complication, without long-term current use of insulin (HCC) - Repeat. Stable blood sugars.   -     HEMOGLOBIN A1C WITH EAG  -     METABOLIC PANEL, BASIC    3. Essential hypertension - stable. On ARB. 4. Primary localized osteoarthrosis of lower leg, unspecified laterality - Hips a bit worse. Suggest seeing Dr. Corina Barker to see if injections may help with his symptoms and allow for more physical activity.   -     REFERRAL TO ORTHOPEDIC SURGERY    5. Pain of both hip joints  -     REFERRAL TO ORTHOPEDIC SURGERY    6. Screening for prostate cancer  -     PSA SCREENING        Follow-up Disposition:  Return in about 3 months (around 8/17/2018).     Felton Sandra MD  5/17/2018

## 2018-05-18 LAB
BUN SERPL-MCNC: 25 MG/DL (ref 8–27)
BUN/CREAT SERPL: 18 (ref 10–24)
CALCIUM SERPL-MCNC: 9.7 MG/DL (ref 8.6–10.2)
CHLORIDE SERPL-SCNC: 100 MMOL/L (ref 96–106)
CO2 SERPL-SCNC: 25 MMOL/L (ref 18–29)
CREAT SERPL-MCNC: 1.36 MG/DL (ref 0.76–1.27)
EST. AVERAGE GLUCOSE BLD GHB EST-MCNC: 134 MG/DL
GFR SERPLBLD CREATININE-BSD FMLA CKD-EPI: 51 ML/MIN/1.73
GFR SERPLBLD CREATININE-BSD FMLA CKD-EPI: 59 ML/MIN/1.73
GLUCOSE SERPL-MCNC: 138 MG/DL (ref 65–99)
HBA1C MFR BLD: 6.3 % (ref 4.8–5.6)
INTERPRETATION: NORMAL
Lab: NORMAL
POTASSIUM SERPL-SCNC: 4.6 MMOL/L (ref 3.5–5.2)
SODIUM SERPL-SCNC: 138 MMOL/L (ref 134–144)

## 2018-05-21 DIAGNOSIS — Z12.5 SCREENING PSA (PROSTATE SPECIFIC ANTIGEN): Primary | ICD-10-CM

## 2018-05-23 LAB
PSA SERPL-MCNC: 3.5 NG/ML (ref 0–4)
SPECIMEN STATUS REPORT, ROLRST: NORMAL

## 2018-08-04 DIAGNOSIS — G89.29 CHRONIC PAIN OF LEFT KNEE: ICD-10-CM

## 2018-08-04 DIAGNOSIS — M25.562 CHRONIC PAIN OF LEFT KNEE: ICD-10-CM

## 2018-08-05 RX ORDER — TRAMADOL HYDROCHLORIDE 50 MG/1
TABLET ORAL
Qty: 180 TAB | Refills: 0 | OUTPATIENT
Start: 2018-08-05

## 2018-08-10 ENCOUNTER — HOSPITAL ENCOUNTER (OUTPATIENT)
Dept: LAB | Age: 73
Discharge: HOME | End: 2018-08-10
Payer: MEDICARE

## 2018-08-10 ENCOUNTER — OFFICE VISIT (OUTPATIENT)
Dept: INTERNAL MEDICINE CLINIC | Age: 73
End: 2018-08-10

## 2018-08-10 VITALS
BODY MASS INDEX: 30.66 KG/M2 | SYSTOLIC BLOOD PRESSURE: 140 MMHG | RESPIRATION RATE: 16 BRPM | DIASTOLIC BLOOD PRESSURE: 70 MMHG | HEIGHT: 69 IN | HEART RATE: 73 BPM | TEMPERATURE: 98.3 F | WEIGHT: 207 LBS | OXYGEN SATURATION: 94 %

## 2018-08-10 DIAGNOSIS — I10 ESSENTIAL HYPERTENSION: Chronic | ICD-10-CM

## 2018-08-10 DIAGNOSIS — Z13.31 SCREENING FOR DEPRESSION: ICD-10-CM

## 2018-08-10 DIAGNOSIS — E11.8 TYPE 2 DIABETES MELLITUS WITH COMPLICATION, WITHOUT LONG-TERM CURRENT USE OF INSULIN (HCC): Chronic | ICD-10-CM

## 2018-08-10 DIAGNOSIS — Z71.89 ADVANCED DIRECTIVES, COUNSELING/DISCUSSION: ICD-10-CM

## 2018-08-10 DIAGNOSIS — E55.9 VITAMIN D DEFICIENCY: ICD-10-CM

## 2018-08-10 DIAGNOSIS — Z00.00 MEDICARE ANNUAL WELLNESS VISIT, SUBSEQUENT: Primary | ICD-10-CM

## 2018-08-10 PROCEDURE — 83036 HEMOGLOBIN GLYCOSYLATED A1C: CPT

## 2018-08-10 PROCEDURE — 82306 VITAMIN D 25 HYDROXY: CPT

## 2018-08-10 PROCEDURE — 36415 COLL VENOUS BLD VENIPUNCTURE: CPT

## 2018-08-10 PROCEDURE — 80053 COMPREHEN METABOLIC PANEL: CPT

## 2018-08-10 PROCEDURE — 82043 UR ALBUMIN QUANTITATIVE: CPT

## 2018-08-10 RX ORDER — TRIAMTERENE/HYDROCHLOROTHIAZID 37.5-25 MG
TABLET ORAL
Qty: 90 TAB | Refills: 3 | Status: SHIPPED | OUTPATIENT
Start: 2018-08-10

## 2018-08-10 RX ORDER — GLIMEPIRIDE 1 MG/1
TABLET ORAL
Qty: 90 TAB | Refills: 2 | Status: SHIPPED | OUTPATIENT
Start: 2018-08-10 | End: 2019-07-01 | Stop reason: SDUPTHER

## 2018-08-10 RX ORDER — ERGOCALCIFEROL 1.25 MG/1
50000 CAPSULE ORAL
Qty: 12 CAP | Refills: 3 | Status: SHIPPED | OUTPATIENT
Start: 2018-08-10

## 2018-08-10 NOTE — PATIENT INSTRUCTIONS
Medicare Wellness Visit, Male    The best way to live healthy is to have a lifestyle where you eat a well-balanced diet, exercise regularly, limit alcohol use, and quit all forms of tobacco/nicotine, if applicable. Regular preventive services are another way to keep healthy. Preventive services (vaccines, screening tests, monitoring & exams) can help personalize your care plan, which helps you manage your own care. Screening tests can find health problems at the earliest stages, when they are easiest to treat. [address] [facility] follows the current, evidence-based guidelines published by the [facility] Bran Lukasz ([facility]) when recommending preventive services for our patients. Because we follow these guidelines, sometimes recommendations change over time as research supports it. (For example, a prostate screening blood test is no longer routinely recommended for men with no symptoms.)    Of course, you and your provider may decide to screen more often for some diseases, based on your risk and co-morbidities (chronic disease you are already diagnosed with). Preventive services for you include:    - Medicare offers their members a free annual wellness visit, which is time for you and your primary care provider to discuss and plan for your preventive service needs. Take advantage of this benefit every year!    -All people over age 72 should receive the recommended pneumonia vaccines. Current USPSTF guidelines recommend a series of two vaccines for the best pneumonia protection.     -All adults should have a yearly flu vaccine and a tetanus vaccine every 10 years.  All adults age 61 years should receive a shingles vaccine once in their lifetime.      -All adults age 38-68 years who are overweight should have a diabetes screening test once every three years.     -Other screening tests & preventive services for persons with diabetes include: an eye exam to screen for diabetic retinopathy, a kidney function test, a foot exam, and stricter control over your cholesterol.     -Cardiovascular screening for adults with routine risk involves an electrocardiogram (ECG) at intervals determined by the provider.     -Colorectal cancer screenings should be done for adults age 54-65 years with normal risk. There are a number of acceptable methods of screening for this type of cancer. Each test has its own benefits and drawbacks. Discuss with your provider what is most appropriate for you during your annual wellness visit. The different tests include: colonoscopy (considered the best screening method), a fecal occult blood test, a fecal DNA test, and sigmoidoscopy.    -All adults born between [facility] should be screened once for Hepatitis C.    -An Abdominal Aortic Aneurysm (AAA) Screening is recommended for men age 73-68 who has ever smoked in their lifetime. Here is a list of your current Health Maintenance items (your personalized list of preventive services) with a due date:  Health Maintenance Due   Topic Date Due    DTaP/Tdap/Td  (1 - Tdap) 12/18/1965    Shingles Vaccine  10/18/2004    Pneumococcal Vaccine (1 of 2 - PCV13) 12/18/2009    Eye Exam  06/30/2018    Flu Vaccine  08/01/2018       Medicare Wellness Visit, Male    The best way to live healthy is to have a lifestyle where you eat a well-balanced diet, exercise regularly, limit alcohol use, and quit all forms of tobacco/nicotine, if applicable. Regular preventive services are another way to keep healthy. Preventive services (vaccines, screening tests, monitoring & exams) can help personalize your care plan, which helps you manage your own care. Screening tests can find health problems at the earliest stages, when they are easiest to treat.      Elijah Harris follows the current, evidence-based guidelines published by the [place] Bran Lukasz (USPSTF) when recommending preventive services for our patients. Because we follow these guidelines, sometimes recommendations change over time as research supports it. (For example, a prostate screening blood test is no longer routinely recommended for men with no symptoms.)    Of course, you and your provider may decide to screen more often for some diseases, based on your risk and co-morbidities (chronic disease you are already diagnosed with). Preventive services for you include:    - Medicare offers their members a free annual wellness visit, which is time for you and your primary care provider to discuss and plan for your preventive service needs. Take advantage of this benefit every year!    -All people over age 72 should receive the recommended pneumonia vaccines. Current USPSTF guidelines recommend a series of two vaccines for the best pneumonia protection.     -All adults should have a yearly flu vaccine and a tetanus vaccine every 10 years. All adults age 61 years should receive a shingles vaccine once in their lifetime.      -All adults age 38-68 years who are overweight should have a diabetes screening test once every three years.     -Other screening tests & preventive services for persons with diabetes include: an eye exam to screen for diabetic retinopathy, a kidney function test, a foot exam, and stricter control over your cholesterol.     -Cardiovascular screening for adults with routine risk involves an electrocardiogram (ECG) at intervals determined by the provider.     -Colorectal cancer screenings should be done for adults age 54-65 years with normal risk. There are a number of acceptable methods of screening for this type of cancer. Each test has its own benefits and drawbacks. Discuss with your provider what is most appropriate for you during your annual wellness visit.  The different tests include: colonoscopy (considered the best screening method), a fecal occult blood test, a fecal DNA test, and sigmoidoscopy.    -All adults born [facility] should be screened once for Hepatitis C.    -An Abdominal Aortic Aneurysm (AAA) Screening is recommended for men age 73-68 who has ever smoked in their lifetime.      Here is a list of your current Health Maintenance items (your personalized list of preventive services) with a due date:  Health Maintenance Due   Topic Date Due    DTaP/Tdap/Td  (1 - Tdap) 12/18/1965    Shingles Vaccine  10/18/2004    Pneumococcal Vaccine (1 of 2 - PCV13) 12/18/2009    Eye Exam  06/30/2018    Flu Vaccine  08/01/2018

## 2018-08-10 NOTE — ACP (ADVANCE CARE PLANNING)
Advance Care Planning    Advance Care Planning (ACP) Provider Conversation Snapshot    Date of ACP Conversation: 08/10/18  Persons included in Conversation:  patient and family  Length of ACP Conversation in minutes:  <16 minutes (Non-Billable)    Authorized Decision Maker (if patient is incapable of making informed decisions):    This person is:   Healthcare Agent/Medical Power of  under Advance Directive          For Patients with Decision Making Capacity:   Values/Goals: Exploration of values, goals, and preferences if recovery is not expected, even with continued medical treatment in the event of:  Imminent death  Severe, permanent brain injury    Conversation Outcomes / Follow-Up Plan:   Reviewed existing Advance Directive   Recommended communicating the plan and making copies for the healthcare agent, personal physician, and others as appropriate (e.g., health system)

## 2018-08-10 NOTE — MR AVS SNAPSHOT
Apurva Murillo 
 
 
 [address]  [facility] [address] [facility] 
[phone] Patient: Alyson Smith MRN: [id number] :1945 Visit Information Date & Time Provider Department Dept. Phone Encounter #  
 8/10/2018  9:30 AM Antoinette Schmid MD [facility] [facility] [id number] Your Appointments 8/10/2018  9:30 AM  
Medicare Physical with Antoinette Schmid MD  
[facility] [address]) Appt Note: medicare wellness visit [address] [facility] [phone] [id number] [phone]  
  
   
 [address] [facility] [id number] Upcoming Health Maintenance Date Due DTaP/Tdap/Td series (1 - Tdap) 1965 ZOSTER VACCINE AGE 60> 10/18/2004 Pneumococcal 65+ Low/Medium Risk (1 of 2 - PCV13) 2009 EYE EXAM RETINAL OR DILATED Q1 2018 Influenza Age 5 to Adult 2018 MICROALBUMIN Q1 2018 HEMOGLOBIN A1C Q6M 2018 LIPID PANEL Q1 2019 FOOT EXAM Q1 2019 GLAUCOMA SCREENING Q2Y 2019 COLONOSCOPY 2020 Allergies as of 8/10/2018  Review Complete On:  By: Huma Mcdonough Wears No Known Allergies Current Immunizations  Never Reviewed Name Date Influenza High Dose Vaccine PF 10/18/2016 10:48 AM  
  
 Not reviewed this visit You Were Diagnosed With   
  
 Codes Comments Type 2 diabetes mellitus with complication, without long-term current use of insulin (HCC)    -  Primary ICD-10-CM: E11.8 ICD-9-CM: 250.90 Medicare annual wellness visit, subsequent     ICD-10-CM: Z00.00 ICD-9-CM: V70.0 Advanced directives, counseling/discussion     ICD-10-CM: Z71.89 ICD-9-CM: V65.49 Essential hypertension     ICD-10-CM: I10 
ICD-9-CM: 401.9 Vitamin D deficiency     ICD-10-CM: E55.9 ICD-9-CM: 268.9 Vitals  BP Pulse Temp Resp Height(growth percentile) Weight(growth percentile) 140/70 73 98.3 °F (36.8 °C) (Oral) 16 5' 9\" (1.753 m) 207 lb (93.9 kg) SpO2 BMI Smoking Status 94% 30.57 kg/m2 Never Smoker Vitals History BMI and BSA Data Body Mass Index Body Surface Area 30.57 kg/m 2 2.14 m 2 Preferred Pharmacy Pharmacy Name Phone [facility] [address] [facility] [phone] Your Updated Medication List  
  
   
This list is accurate as of 8/10/18  9:21 AM.  Always use your most recent med list.  
  
  
  
  
 BD Single Use Swabs Regular Padm Generic drug:  alcohol swabs CHECK BLOOD SUGAR ONCE DAILY CALCIUM 500 PO Take 1 Tab by mouth two (2) times a day. CENTRUM SILVER PO Take 1 Tab by mouth daily. ergocalciferol 50,000 unit capsule Commonly known as:  VITAMIN D2 Take 1 Cap by mouth every thirty (30) days. Takes 1st of month * glimepiride 1 mg tablet Commonly known as:  AMARYL  
TAKE 1 TABLET BY MOUTH DAILY BEFORE BREAKFAST * glimepiride 1 mg tablet Commonly known as:  AMARYL  
TAKE 1 TABLET BY MOUTH DAILY BEFORE BREAKFAST  
  
 loratadine 10 mg tablet Commonly known as:  CLARITIN  
TK 1 T PO QD FOR ALLERGY SYPMTOMS  
  
 losartan 100 mg tablet Commonly known as:  COZAAR  
TAKE 1 TABLET BY MOUTH DAILY  
  
 metFORMIN 1,000 mg tablet Commonly known as:  GLUCOPHAGE  
TAKE 1 TABLET BY MOUTH TWICE DAILY WITH MEALS  
  
 omeprazole 20 mg capsule Commonly known as:  PRILOSEC  
TAKE 1 CAPSULE BY MOUTH EVERY DAY  
  
 ONETOUCH DELICA LANCETS 30 gauge Misc Generic drug:  lancets CHECK BLOOD SUGAR DAILY  
  
 ONETOUCH ULTRA TEST strip Generic drug:  glucose blood VI test strips CHECK BLOOD SUGAR DAILY  
  
 traMADol 50 mg tablet Commonly known as:  ULTRAM  
TAKE 1 TABLET BY MOUTH TWICE DAILY AS NEEDED FOR PAIN  
  
 triamterene-hydroCHLOROthiazide 37.5-25 mg per tablet Commonly known as:  Arville Rho TK 1 T PO QD  
  
 * Notice: This list has 2 medication(s) that are the same as other medications prescribed for you. Read the directions carefully, and ask your doctor or other care provider to review them with you. Prescriptions Printed Refills  
 ergocalciferol (VITAMIN D2) 50,000 unit capsule 3 Sig: Take 1 Cap by mouth every thirty (30) days. Takes 1st of month Class: Print Route: Oral  
 glimepiride (AMARYL) 1 mg tablet 2 Sig: TAKE 1 TABLET BY MOUTH DAILY BEFORE BREAKFAST Class: Print  
 triamterene-hydroCHLOROthiazide (MAXZIDE) 37.5-25 mg per tablet 3 Sig: TK 1 T PO QD Class: Print We Performed the Following HEMOGLOBIN A1C WITH EAG [57758 CPT(R)] METABOLIC PANEL, COMPREHENSIVE [78590 CPT(R)] MICROALBUMIN, UR, RAND W/ MICROALB/CREAT RATIO [id number] CPT(R)] VITAMIN D, 25 HYDROXY [id number] CPT(R)] Patient Instructions Medicare Wellness Visit, Male The best way to live healthy is to have a lifestyle where you eat a well-balanced diet, exercise regularly, limit alcohol use, and quit all forms of tobacco/nicotine, if applicable. Regular preventive services are another way to keep healthy. Preventive services (vaccines, screening tests, monitoring & exams) can help personalize your care plan, which helps you manage your own care. Screening tests can find health problems at the earliest stages, when they are easiest to treat. [address] [facility] follows the current, evidence-based guidelines published by the [facility] Bran Lukasz ([facility]) when recommending preventive services for our patients. Because we follow these guidelines, sometimes recommendations change over time as research supports it. (For example, a prostate screening blood test is no longer routinely recommended for men with no symptoms.) Of course, you and your provider may decide to screen more often for some diseases, based on your risk and co-morbidities (chronic disease you are already diagnosed with). Preventive services for you include: - Medicare offers their members a free annual wellness visit, which is time for you and your primary care provider to discuss and plan for your preventive service needs. Take advantage of this benefit every year! 
 
-All people over age 72 should receive the recommended pneumonia vaccines. Current USPSTF guidelines recommend a series of two vaccines for the best pneumonia protection.  
 
-All adults should have a yearly flu vaccine and a tetanus vaccine every 10 years. All adults age 61 years should receive a shingles vaccine once in their lifetime.   
 
-All adults age 38-68 years who are overweight should have a diabetes screening test once every three years.  
 
-Other screening tests & preventive services for persons with diabetes include: an eye exam to screen for diabetic retinopathy, a kidney function test, a foot exam, and stricter control over your cholesterol.  
 
-Cardiovascular screening for adults with routine risk involves an electrocardiogram (ECG) at intervals determined by the provider.  
 
-Colorectal cancer screenings should be done for adults age 54-65 years with normal risk. There are a number of acceptable methods of screening for this type of cancer. Each test has its own benefits and drawbacks. Discuss with your provider what is most appropriate for you during your annual wellness visit. The different tests include: colonoscopy (considered the best screening method), a fecal occult blood test, a fecal DNA test, and sigmoidoscopy. 
 
-All adults born between [facility] should be screened once for Hepatitis C. 
 
-An Abdominal Aortic Aneurysm (AAA) Screening is recommended for men age 73-68 who has ever smoked in their lifetime. Here is a list of your current Health Maintenance items (your personalized list of preventive services) with a due date: 
Health Maintenance Due Topic Date Due  
 DTaP/Tdap/Td  (1 - Tdap) 12/18/1965  Shingles Vaccine  10/18/2004  Pneumococcal Vaccine (1 of 2 - PCV13) 12/18/2009 [facility] Eye Exam  06/30/2018  Flu Vaccine  08/01/2018 Introducing [facility]! Fallon Arceo introduces [employer] patient portal. Now you can access parts of your medical record, email your doctor's office, and request medication refills online. 1. In your internet browser, go to [email or web address] 2. Click on the First Time User? Click Here link in the Sign In box. You will see the New Member Sign Up page. 3. Enter your [employer] Access Code exactly as it appears below. You will not need to use this code after youve completed the sign-up process. If you do not sign up before the expiration date, you must request a new code. · [employer] Access Code: [email or web address] Expires: 8/15/2018  9:33 AM 
 
4. Enter the last four digits of your Social Security Number (xxxx) and Date of Birth (mm/dd/yyyy) as indicated and click Submit. You will be taken to the next sign-up page. 5. Create a [employer]rack ID. This will be your [employer] login ID and cannot be changed, so think of one that is secure and easy to remember. 6. Create a [employer] password. You can change your password at any time. 7. Enter your Password Reset Question and Answer. This can be used at a later time if you forget your password. 8. Enter your e-mail address. You will receive e-mail notification when new information is available in [address] 9. Click Sign Up. You can now view and download portions of your medical record. 10. Click the Download Summary menu link to download a portable copy of your medical information. If you have questions, please visit the Frequently Asked Questions section of the [employer] website. Remember, [employer] is NOT to be used for urgent needs. For medical emergencies, dial 911. Now available from your iPhone and Android!  
 
  
  
 Please provide this summary of care documentation to your next provider. Your primary care clinician is listed as Alaina Estrella. If you have any questions after today's visit, please call [phone].

## 2018-08-10 NOTE — PROGRESS NOTES
Tracy Jesus is a 67 y.o. male who presents today for Annual Wellness Visit  . He has a history of   Patient Active Problem List   Diagnosis Code    Rotator cuff tear M75.100    Biceps rupture, proximal S46.119A    Rotator cuff tear arthropathy of right shoulder M12.811    Primary localized osteoarthrosis, lower leg M17.10    Hypertension I10    DM type 2 (diabetes mellitus, type 2) ([facility] [address] E11.9    Renal insufficiency N28.9    GERD (gastroesophageal reflux disease) K21.9    Vitamin D deficiency E55.9   . Today patient is here for f/u.   he does not have other concerns. Moving to [place] this week. Diabetes Mellitus follow-up - on amaryl, metformin. Good Fasting BG. Last hemoglobin a1c   Lab Results   Component Value Date/Time    Hemoglobin A1c 6.3 (H) 05/17/2018 09:56 AM     No components found for: POCA1C, HBA1C POC  medication compliance: compliant all of the time. Diabetic diet compliance: compliant most of the time. Home glucose monitoring: fasting values range 115-150     Hypoglycemic episodes:  None. Further diabetic ROS: no polyuria or polydipsia, no chest pain, dyspnea or TIA's, no numbness, tingling or pain in extremities. Last Eye Exam: UTD  Last Foot Exam: UTD  Microalbuminuria:   Lab Results   Component Value Date/Time    Microalb/Creat ratio (ug/mg creat.) 3.5 11/08/2017 12:48 PM     Hypertension - on ARB, diuretic. A bit up. Hypertension ROS: taking medications as instructed, no medication side effects noted, no TIA's, no chest pain on exertion, no dyspnea on exertion, no swelling of ankles     reports that he has never smoked. He has never used smokeless tobacco.    reports that he drinks alcohol. BP Readings from Last 2 Encounters:   08/10/18 140/70   05/17/18 135/75     Low Mood: notes much better. Moving back to [place] making this better. Health maintenance hx includes:  Exercise: moderately active. Form of exercise: more recently.     Diet: generally follows a low fat low cholesterol diet  Social: retired in [employer] and [employer]. Has more family back in [address] [place] [address]. Cancer screening:    Colon cancer screening:  Last Colonoscopy: 2015and was abnormal: 5 yrs   Prostate cancer screening: PSA and/or DAPHNE: 2018 and was normal    Immunizations:     Immunization History   Administered Date(s) Administered    Influenza High Dose Vaccine  10/18/2016      Immunization status: stated as current, but no records available. ROS  Review of Systems   Constitutional: Negative for chills, fever and weight loss. HENT: Negative for congestion and sore throat. Eyes: Negative for blurred vision, double vision and photophobia. Respiratory: Negative for cough and shortness of breath. Cardiovascular: Negative for chest pain, palpitations and leg swelling. Gastrointestinal: Negative for abdominal pain, constipation, diarrhea, heartburn, nausea and vomiting. Genitourinary: Negative for dysuria, frequency and urgency. Musculoskeletal: Negative for joint pain and myalgias. Skin: Negative for rash. Neurological: Negative. Negative for headaches. Endo/Heme/Allergies: Does not bruise/bleed easily. Psychiatric/Behavioral: Negative for depression, memory loss and suicidal ideas. The patient is not nervous/anxious. Visit Vitals    /70    Pulse 73    Temp 98.3 °F (36.8 °C) (Oral)    Resp 16    Ht 5' 9\" (1.753 m)    Wt 207 lb (93.9 kg)    SpO2 94%    BMI 30.57 kg/m2       Physical Exam   Constitutional: He is oriented to person, place, and time. He appears well-developed and well-nourished. HENT:   Head: Normocephalic and atraumatic. Eyes: EOM are normal. Pupils are equal, round, and reactive to light. Cardiovascular: Normal rate and regular rhythm. No murmur heard. Pulmonary/Chest: Effort normal and breath sounds normal. No respiratory distress. Musculoskeletal: Normal range of motion. He exhibits no edema.    Neurological: He is alert and oriented to person, place, and time. Skin: Skin is warm and dry. He is not diaphoretic. Psychiatric: He has a normal mood and affect. His behavior is normal.         Current Outpatient Prescriptions   Medication Sig    ergocalciferol (VITAMIN D2) 50,000 unit capsule Take 1 Cap by mouth every thirty (30) days. Takes 1st of month    glimepiride (AMARYL) 1 mg tablet TAKE 1 TABLET BY MOUTH DAILY BEFORE BREAKFAST    triamterene-hydroCHLOROthiazide (MAXZIDE) 37.5-25 mg per tablet TK 1 T PO QD    traMADol (ULTRAM) 50 mg tablet TAKE 1 TABLET BY MOUTH TWICE DAILY AS NEEDED FOR PAIN    BD SINGLE USE SWABS REGULAR padm CHECK BLOOD SUGAR ONCE DAILY    ONETOUCH DELICA LANCETS 30 gauge misc CHECK BLOOD SUGAR DAILY    metFORMIN (GLUCOPHAGE) 1,000 mg tablet TAKE 1 TABLET BY MOUTH TWICE DAILY WITH MEALS    losartan (COZAAR) 100 mg tablet TAKE 1 TABLET BY MOUTH DAILY    omeprazole (PRILOSEC) 20 mg capsule TAKE 1 CAPSULE BY MOUTH EVERY DAY    glimepiride (AMARYL) 1 mg tablet TAKE 1 TABLET BY MOUTH DAILY BEFORE BREAKFAST    ONETOUCH ULTRA TEST strip CHECK BLOOD SUGAR DAILY    CALCIUM CARBONATE (CALCIUM 500 PO) Take 1 Tab by mouth two (2) times a day.  FOLIC ACID/MULTIVIT-MIN/LUTEIN (CENTRUM SILVER PO) Take 1 Tab by mouth daily.  loratadine (CLARITIN) 10 mg tablet TK 1 T PO QD FOR ALLERGY SYPMTOMS     No current facility-administered medications for this visit.          Past Medical History:   Diagnosis Date    Arthritis     Diabetes ([address]     GERD (gastroesophageal reflux disease)     Hypertension     Nausea & vomiting       Past Surgical History:   Procedure Laterality Date    HX CATARACT REMOVAL Bilateral 9/2012    HX KNEE ARTHROSCOPY Left     HX MOHS PROCEDURES Right 3/2015    and bicep tear repair    HX ORTHOPAEDIC Left 1/2012    total knee replacement      Social History   Substance Use Topics    Smoking status: Never Smoker    Smokeless tobacco: Never Used    Alcohol use Yes      Comment: less than 1 beer/month      Family History   Problem Relation Age of Onset    Heart Disease Father     Stroke Father     Hypertension Father     Cancer Brother      lung    Cancer Brother      lung        No Known Allergies     Assessment/Plan  Diagnoses and all orders for this visit:    1. Medicare annual wellness visit, subsequent - Labs today. Otherwise doing well. Urged to get immunization records as we still do not have these. Low mood and poor thoughts much better with upcoming move. Beatriz Powers was counseled on age-appropriate/ guideline-based risk prevention behaviors and screening for a 67y.o. year old   male . We also discussed adjustments in screening based on family history if necessary. Printed instructions for preventative screening guidelines and healthy behaviors given to patient with after visit summary. 2. Advanced directives, counseling/discussion    3. Type 2 diabetes mellitus with complication, without long-term current use of insulin ([facility] [address] -repeat. Keep same meds. -     HEMOGLOBIN A1C WITH EAG  -     METABOLIC PANEL, COMPREHENSIVE  -     MICROALBUMIN, UR, RAND W/ MICROALB/CREAT RATIO  -     glimepiride (AMARYL) 1 mg tablet; TAKE 1 TABLET BY MOUTH DAILY BEFORE BREAKFAST    4. Essential hypertension - monitor.   -     triamterene-hydroCHLOROthiazide (MAXZIDE) 37.5-25 mg per tablet; TK 1 T PO QD    5. Vitamin D deficiency  -     ergocalciferol (VITAMIN D2) 50,000 unit capsule; Take 1 Cap by mouth every thirty (30) days. Takes 1st of month  -     VITAMIN D, 25 HYDROXY        Follow-up Disposition:  Return if symptoms worsen or fail to improve. Marilyn River MD  8/10/2018        This is the Subsequent Medicare Annual Wellness Exam, performed 12 months or more after the Initial AWV or the last Subsequent AWV    I have reviewed the patient's medical history in detail and updated the computerized patient record.      History     Past Medical History:   Diagnosis Date    Arthritis     Diabetes ([facility] [address]     GERD (gastroesophageal reflux disease)     Hypertension     Nausea & vomiting       Past Surgical History:   Procedure Laterality Date    HX CATARACT REMOVAL Bilateral 9/2012    HX KNEE ARTHROSCOPY Left     HX MOHS PROCEDURES Right 3/2015    and bicep tear repair    HX ORTHOPAEDIC Left 1/2012    total knee replacement     Current Outpatient Prescriptions   Medication Sig Dispense Refill    ergocalciferol (VITAMIN D2) 50,000 unit capsule Take 1 Cap by mouth every thirty (30) days. Takes 1st of month 12 Cap 3    glimepiride (AMARYL) 1 mg tablet TAKE 1 TABLET BY MOUTH DAILY BEFORE BREAKFAST 90 Tab 2    triamterene-hydroCHLOROthiazide (MAXZIDE) 37.5-25 mg per tablet TK 1 T PO QD 90 Tab 3    traMADol (ULTRAM) 50 mg tablet TAKE 1 TABLET BY MOUTH TWICE DAILY AS NEEDED FOR PAIN 180 Tab 0    BD SINGLE USE SWABS REGULAR padm CHECK BLOOD SUGAR ONCE DAILY 100 Pad 11    ONETOUCH DELICA LANCETS 30 gauge misc CHECK BLOOD SUGAR DAILY 100 Lancet 11    metFORMIN (GLUCOPHAGE) 1,000 mg tablet TAKE 1 TABLET BY MOUTH TWICE DAILY WITH MEALS 180 Tab 1    losartan (COZAAR) 100 mg tablet TAKE 1 TABLET BY MOUTH DAILY 90 Tab 1    omeprazole (PRILOSEC) 20 mg capsule TAKE 1 CAPSULE BY MOUTH EVERY DAY 90 Cap 1    glimepiride (AMARYL) 1 mg tablet TAKE 1 TABLET BY MOUTH DAILY BEFORE BREAKFAST 90 Tab 1    ONETOUCH ULTRA TEST strip CHECK BLOOD SUGAR DAILY 100 Strip 11    CALCIUM CARBONATE (CALCIUM 500 PO) Take 1 Tab by mouth two (2) times a day.  FOLIC ACID/MULTIVIT-MIN/LUTEIN (CENTRUM SILVER PO) Take 1 Tab by mouth daily.       loratadine (CLARITIN) 10 mg tablet TK 1 T PO QD FOR ALLERGY SYPMTOMS  0     No Known Allergies  Family History   Problem Relation Age of Onset    Heart Disease Father     Stroke Father     Hypertension Father     Cancer Brother      lung    Cancer Brother      lung     Social History   Substance Use Topics    Smoking status: Never Smoker    Smokeless tobacco: Never Used    Alcohol use Yes      Comment: less than 1 beer/month     Patient Active Problem List   Diagnosis Code    Rotator cuff tear M75.100    Biceps rupture, proximal S46.119A    Rotator cuff tear arthropathy of right shoulder M12.811    Primary localized osteoarthrosis, lower leg M17.10    Hypertension I10    DM type 2 (diabetes mellitus, type 2) ([facility] E11.9    Renal insufficiency N28.9    GERD (gastroesophageal reflux disease) K21.9    Vitamin D deficiency E55.9       Depression Risk Factor Screening:     PHQ over the last two weeks 8/10/2018   Little interest or pleasure in doing things Not at all   Feeling down, depressed, irritable, or hopeless Not at all   Total Score PHQ 2 0   Trouble falling or staying asleep, or sleeping too much Not at all   Feeling tired or having little energy Not at all   Poor appetite, weight loss, or overeating Not at all   Feeling bad about yourself - or that you are a failure or have let yourself or your family down Not at all   Trouble concentrating on things such as school, work, reading, or watching TV Not at all   Moving or speaking so slowly that other people could have noticed; or the opposite being so fidgety that others notice Not at all   Thoughts of being better off dead, or hurting yourself in some way Not at all   PHQ 9 Score 0   How difficult have these problems made it for you to do your work, take care of your home and get along with others Not difficult at all     Alcohol Risk Factor Screening: You do not drink alcohol or very rarely. Functional Ability and Level of Safety:   Hearing Loss  Hearing is good. Activities of Daily Living  The home contains: no safety equipment. Patient does total self care    Fall Risk  Fall Risk Assessment, last 12 mths 8/10/2018   Able to walk? Yes   Fall in past 12 months?  No       Abuse Screen  Patient is not abused    Cognitive Screening   Evaluation of Cognitive Function:  Has your family/caregiver stated any concerns about your memory: no  Normal    Patient Care Team   Patient Care Team:  Lm Burks MD as PCP - General (Internal Medicine)  Alfreda Santizo MD (Gastroenterology)    Assessment/Plan   Education and counseling provided:  Are appropriate based on today's review and evaluation  End-of-Life planning (with patient's consent)  Pneumococcal Vaccine  Prostate cancer screening tests (PSA, covered annually)  Colorectal cancer screening tests  Bone mass measurement (DEXA)    Diagnoses and all orders for this visit:    1. Medicare annual wellness visit, subsequent    2. Advanced directives, counseling/discussion    3. Type 2 diabetes mellitus with complication, without long-term current use of insulin (HCC)  -     HEMOGLOBIN A1C WITH EAG  -     METABOLIC PANEL, COMPREHENSIVE  -     MICROALBUMIN, UR, RAND W/ MICROALB/CREAT RATIO  -     glimepiride (AMARYL) 1 mg tablet; TAKE 1 TABLET BY MOUTH DAILY BEFORE BREAKFAST    4. Essential hypertension  -     triamterene-hydroCHLOROthiazide (MAXZIDE) 37.5-25 mg per tablet; TK 1 T PO QD    5. Vitamin D deficiency  -     ergocalciferol (VITAMIN D2) 50,000 unit capsule; Take 1 Cap by mouth every thirty (30) days.  Takes 1st of month  -     VITAMIN D, 25 HYDROXY    6. Screening for depression  -     Depression Screen Annual        Health Maintenance Due   Topic Date Due    DTaP/Tdap/Td series (1 - Tdap) 12/18/1965    ZOSTER VACCINE AGE 60>  10/18/2004    Pneumococcal 65+ Low/Medium Risk (1 of 2 - PCV13) 12/18/2009    EYE EXAM RETINAL OR DILATED Q1  06/30/2018    Influenza Age 9 to Adult  08/01/2018

## 2018-08-11 LAB
25(OH)D3+25(OH)D2 SERPL-MCNC: 28.7 NG/ML (ref 30–100)
ALBUMIN SERPL-MCNC: 4.4 G/DL (ref 3.5–4.8)
ALBUMIN/CREAT UR: 5.7 MG/G CREAT (ref 0–30)
ALBUMIN/GLOB SERPL: 1.6 {RATIO} (ref 1.2–2.2)
ALP SERPL-CCNC: 81 IU/L (ref 39–117)
ALT SERPL-CCNC: 18 IU/L (ref 0–44)
AST SERPL-CCNC: 21 IU/L (ref 0–40)
BILIRUB SERPL-MCNC: 0.2 MG/DL (ref 0–1.2)
BUN SERPL-MCNC: 21 MG/DL (ref 8–27)
BUN/CREAT SERPL: 15 (ref 10–24)
CALCIUM SERPL-MCNC: 9.2 MG/DL (ref 8.6–10.2)
CHLORIDE SERPL-SCNC: 101 MMOL/L (ref 96–106)
CO2 SERPL-SCNC: 22 MMOL/L (ref 20–29)
CREAT SERPL-MCNC: 1.42 MG/DL (ref 0.76–1.27)
CREAT UR-MCNC: 125.7 MG/DL
EST. AVERAGE GLUCOSE BLD GHB EST-MCNC: 140 MG/DL
GLOBULIN SER CALC-MCNC: 2.7 G/DL (ref 1.5–4.5)
GLUCOSE SERPL-MCNC: 196 MG/DL (ref 65–99)
HBA1C MFR BLD: 6.5 % (ref 4.8–5.6)
INTERPRETATION: NORMAL
Lab: NORMAL
MICROALBUMIN UR-MCNC: 7.2 UG/ML
POTASSIUM SERPL-SCNC: 4.5 MMOL/L (ref 3.5–5.2)
PROT SERPL-MCNC: 7.1 G/DL (ref 6–8.5)
SODIUM SERPL-SCNC: 141 MMOL/L (ref 134–144)

## 2018-08-28 ENCOUNTER — TELEPHONE (OUTPATIENT)
Dept: INTERNAL MEDICINE CLINIC | Age: 73
End: 2018-08-28

## 2018-08-28 NOTE — TELEPHONE ENCOUNTER
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 Stated Dr. Annabel Fiore had written a letter for the Pt. and she has a few questions regarding the letter also stated she left two messages for Carlita Heard on 7/17/2018 and 7/24/2018. Best contact [phone] option 2.

## 2018-09-06 ENCOUNTER — TELEPHONE (OUTPATIENT)
Dept: INTERNAL MEDICINE CLINIC | Age: 73
End: 2018-09-06

## 2018-09-06 NOTE — TELEPHONE ENCOUNTER
Spoke with  for Mr. Mccall Maged. Had provided letter about CKD in January 2018. Needs to show that his CKD predated this letter and would be a contraindication for NSAIDs.

## 2018-09-20 ENCOUNTER — TELEPHONE (OUTPATIENT)
Dept: INTERNAL MEDICINE CLINIC | Age: 73
End: 2018-09-20

## 2018-09-20 NOTE — TELEPHONE ENCOUNTER
Confirmed we received the documents an advised Dr. Aguilar Pelaez will be out of the office until Sept. 27th and will complete it then.

## 2018-09-20 NOTE — TELEPHONE ENCOUNTER
----- Message from Drake Rosenbaum sent at 9/20/2018  9:07 AM EDT -----  Regarding: Dr. Alix Shay wants to confirm you received their questionnaire they sent over on September 17, 2018. Their  Number is [phone].